# Patient Record
Sex: FEMALE | Race: WHITE | Employment: PART TIME | URBAN - NONMETROPOLITAN AREA
[De-identification: names, ages, dates, MRNs, and addresses within clinical notes are randomized per-mention and may not be internally consistent; named-entity substitution may affect disease eponyms.]

---

## 2018-12-02 ENCOUNTER — HOSPITAL ENCOUNTER (EMERGENCY)
Age: 43
Discharge: HOME | End: 2018-12-02
Attending: EMERGENCY MEDICINE
Payer: COMMERCIAL

## 2018-12-02 ENCOUNTER — OFFICE VISIT (OUTPATIENT)
Dept: URGENT CARE | Age: 43
End: 2018-12-02

## 2018-12-02 ENCOUNTER — APPOINTMENT (OUTPATIENT)
Dept: CT IMAGING | Age: 43
End: 2018-12-02
Payer: COMMERCIAL

## 2018-12-02 VITALS
RESPIRATION RATE: 15 BRPM | WEIGHT: 270 LBS | SYSTOLIC BLOOD PRESSURE: 135 MMHG | TEMPERATURE: 97.9 F | OXYGEN SATURATION: 98 % | DIASTOLIC BLOOD PRESSURE: 76 MMHG | HEART RATE: 66 BPM | BODY MASS INDEX: 51.02 KG/M2

## 2018-12-02 VITALS
HEART RATE: 69 BPM | OXYGEN SATURATION: 98 % | WEIGHT: 268 LBS | SYSTOLIC BLOOD PRESSURE: 138 MMHG | HEIGHT: 61 IN | DIASTOLIC BLOOD PRESSURE: 96 MMHG | TEMPERATURE: 97.2 F | RESPIRATION RATE: 18 BRPM | BODY MASS INDEX: 50.6 KG/M2

## 2018-12-02 DIAGNOSIS — R11.2 NAUSEA AND VOMITING, INTRACTABILITY OF VOMITING NOT SPECIFIED, UNSPECIFIED VOMITING TYPE: ICD-10-CM

## 2018-12-02 DIAGNOSIS — R11.2 NAUSEA VOMITING AND DIARRHEA: Primary | ICD-10-CM

## 2018-12-02 DIAGNOSIS — R19.7 NAUSEA VOMITING AND DIARRHEA: Primary | ICD-10-CM

## 2018-12-02 DIAGNOSIS — R10.13 EPIGASTRIC PAIN: ICD-10-CM

## 2018-12-02 DIAGNOSIS — R10.84 GENERALIZED ABDOMINAL PAIN: Primary | ICD-10-CM

## 2018-12-02 LAB
ALBUMIN SERPL-MCNC: 4 G/DL (ref 3.5–5.2)
ALP BLD-CCNC: 95 U/L (ref 35–104)
ALT SERPL-CCNC: 21 U/L (ref 5–33)
AMYLASE: 19 U/L (ref 28–100)
ANION GAP SERPL CALCULATED.3IONS-SCNC: 14 MMOL/L (ref 7–19)
AST SERPL-CCNC: 13 U/L (ref 5–32)
BASOPHILS ABSOLUTE: 0 K/UL (ref 0–0.2)
BASOPHILS RELATIVE PERCENT: 0.3 % (ref 0–1)
BILIRUB SERPL-MCNC: <0.2 MG/DL (ref 0.2–1.2)
BILIRUBIN URINE: NEGATIVE
BLOOD, URINE: NEGATIVE
BUN BLDV-MCNC: 18 MG/DL (ref 6–20)
CALCIUM SERPL-MCNC: 9.2 MG/DL (ref 8.6–10)
CHLORIDE BLD-SCNC: 102 MMOL/L (ref 98–111)
CLARITY: CLEAR
CO2: 24 MMOL/L (ref 22–29)
COLOR: YELLOW
CREAT SERPL-MCNC: 1 MG/DL (ref 0.5–0.9)
EOSINOPHILS ABSOLUTE: 0.1 K/UL (ref 0–0.6)
EOSINOPHILS RELATIVE PERCENT: 0.5 % (ref 0–5)
GFR NON-AFRICAN AMERICAN: >60
GLUCOSE BLD-MCNC: 127 MG/DL (ref 74–109)
GLUCOSE URINE: NEGATIVE MG/DL
HCG QUALITATIVE: NEGATIVE
HCT VFR BLD CALC: 44.1 % (ref 37–47)
HEMOGLOBIN: 15 G/DL (ref 12–16)
KETONES, URINE: NEGATIVE MG/DL
LEUKOCYTE ESTERASE, URINE: NEGATIVE
LIPASE: 13 U/L (ref 13–60)
LYMPHOCYTES ABSOLUTE: 3 K/UL (ref 1.1–4.5)
LYMPHOCYTES RELATIVE PERCENT: 30.3 % (ref 20–40)
MCH RBC QN AUTO: 29.3 PG (ref 27–31)
MCHC RBC AUTO-ENTMCNC: 34 G/DL (ref 33–37)
MCV RBC AUTO: 86.1 FL (ref 81–99)
MONOCYTES ABSOLUTE: 0.5 K/UL (ref 0–0.9)
MONOCYTES RELATIVE PERCENT: 4.9 % (ref 0–10)
NEUTROPHILS ABSOLUTE: 6.4 K/UL (ref 1.5–7.5)
NEUTROPHILS RELATIVE PERCENT: 63.7 % (ref 50–65)
NITRITE, URINE: NEGATIVE
PDW BLD-RTO: 12 % (ref 11.5–14.5)
PH UA: 6
PLATELET # BLD: 262 K/UL (ref 130–400)
PMV BLD AUTO: 9.9 FL (ref 9.4–12.3)
POTASSIUM REFLEX MAGNESIUM: 3.7 MMOL/L (ref 3.5–5)
PROTEIN UA: NEGATIVE MG/DL
RBC # BLD: 5.12 M/UL (ref 4.2–5.4)
SODIUM BLD-SCNC: 140 MMOL/L (ref 136–145)
SPECIFIC GRAVITY UA: 1.04
TOTAL PROTEIN: 6.9 G/DL (ref 6.6–8.7)
URINE REFLEX TO CULTURE: NORMAL
UROBILINOGEN, URINE: 0.2 E.U./DL
WBC # BLD: 10 K/UL (ref 4.8–10.8)

## 2018-12-02 PROCEDURE — 6360000004 HC RX CONTRAST MEDICATION: Performed by: EMERGENCY MEDICINE

## 2018-12-02 PROCEDURE — S0028 INJECTION, FAMOTIDINE, 20 MG: HCPCS | Performed by: EMERGENCY MEDICINE

## 2018-12-02 PROCEDURE — 82150 ASSAY OF AMYLASE: CPT

## 2018-12-02 PROCEDURE — 6360000002 HC RX W HCPCS: Performed by: EMERGENCY MEDICINE

## 2018-12-02 PROCEDURE — 99999 PR OFFICE/OUTPT VISIT,PROCEDURE ONLY: CPT | Performed by: NURSE PRACTITIONER

## 2018-12-02 PROCEDURE — 96374 THER/PROPH/DIAG INJ IV PUSH: CPT

## 2018-12-02 PROCEDURE — 36415 COLL VENOUS BLD VENIPUNCTURE: CPT

## 2018-12-02 PROCEDURE — 2500000003 HC RX 250 WO HCPCS: Performed by: EMERGENCY MEDICINE

## 2018-12-02 PROCEDURE — 99284 EMERGENCY DEPT VISIT MOD MDM: CPT | Performed by: EMERGENCY MEDICINE

## 2018-12-02 PROCEDURE — 99284 EMERGENCY DEPT VISIT MOD MDM: CPT

## 2018-12-02 PROCEDURE — 80053 COMPREHEN METABOLIC PANEL: CPT

## 2018-12-02 PROCEDURE — 2580000003 HC RX 258: Performed by: EMERGENCY MEDICINE

## 2018-12-02 PROCEDURE — 74177 CT ABD & PELVIS W/CONTRAST: CPT

## 2018-12-02 PROCEDURE — 85025 COMPLETE CBC W/AUTO DIFF WBC: CPT

## 2018-12-02 PROCEDURE — 96376 TX/PRO/DX INJ SAME DRUG ADON: CPT

## 2018-12-02 PROCEDURE — 84703 CHORIONIC GONADOTROPIN ASSAY: CPT

## 2018-12-02 PROCEDURE — 81003 URINALYSIS AUTO W/O SCOPE: CPT

## 2018-12-02 PROCEDURE — 83690 ASSAY OF LIPASE: CPT

## 2018-12-02 PROCEDURE — 96375 TX/PRO/DX INJ NEW DRUG ADDON: CPT

## 2018-12-02 RX ORDER — FAMOTIDINE 20 MG/1
20 TABLET, FILM COATED ORAL 2 TIMES DAILY
Qty: 60 TABLET | Refills: 0 | Status: SHIPPED | OUTPATIENT
Start: 2018-12-02 | End: 2019-04-25

## 2018-12-02 RX ORDER — ONDANSETRON 2 MG/ML
4 INJECTION INTRAMUSCULAR; INTRAVENOUS ONCE
Status: COMPLETED | OUTPATIENT
Start: 2018-12-02 | End: 2018-12-02

## 2018-12-02 RX ORDER — LORAZEPAM 2 MG/ML
0.5 INJECTION INTRAMUSCULAR ONCE
Status: COMPLETED | OUTPATIENT
Start: 2018-12-02 | End: 2018-12-02

## 2018-12-02 RX ORDER — HYDROCODONE BITARTRATE AND ACETAMINOPHEN 7.5; 325 MG/1; MG/1
1 TABLET ORAL EVERY 6 HOURS PRN
Qty: 15 TABLET | Refills: 0 | Status: SHIPPED | OUTPATIENT
Start: 2018-12-02 | End: 2018-12-05

## 2018-12-02 RX ORDER — ONDANSETRON 8 MG/1
8 TABLET, ORALLY DISINTEGRATING ORAL EVERY 8 HOURS PRN
Qty: 15 TABLET | Refills: 0 | Status: SHIPPED | OUTPATIENT
Start: 2018-12-02 | End: 2019-04-25

## 2018-12-02 RX ORDER — 0.9 % SODIUM CHLORIDE 0.9 %
1000 INTRAVENOUS SOLUTION INTRAVENOUS ONCE
Status: COMPLETED | OUTPATIENT
Start: 2018-12-02 | End: 2018-12-02

## 2018-12-02 RX ADMIN — IOPAMIDOL 90 ML: 755 INJECTION, SOLUTION INTRAVENOUS at 17:40

## 2018-12-02 RX ADMIN — ONDANSETRON 4 MG: 2 INJECTION INTRAMUSCULAR; INTRAVENOUS at 16:36

## 2018-12-02 RX ADMIN — LORAZEPAM 1 MG: 2 INJECTION INTRAMUSCULAR; INTRAVENOUS at 17:27

## 2018-12-02 RX ADMIN — FAMOTIDINE 40 MG: 10 INJECTION, SOLUTION INTRAVENOUS at 17:27

## 2018-12-02 RX ADMIN — ONDANSETRON 4 MG: 2 INJECTION INTRAMUSCULAR; INTRAVENOUS at 19:48

## 2018-12-02 RX ADMIN — SODIUM CHLORIDE 1000 ML: 9 INJECTION, SOLUTION INTRAVENOUS at 17:27

## 2018-12-02 ASSESSMENT — ENCOUNTER SYMPTOMS
DIARRHEA: 1
VOMITING: 1
NAUSEA: 1
CONSTIPATION: 0
CHOKING: 0
EYE DISCHARGE: 0
FACIAL SWELLING: 0
VOICE CHANGE: 0
BLOOD IN STOOL: 0
SHORTNESS OF BREATH: 0
SINUS PRESSURE: 0
APNEA: 0
SORE THROAT: 0
ABDOMINAL PAIN: 1

## 2019-02-06 ENCOUNTER — TELEPHONE (OUTPATIENT)
Dept: OBGYN | Age: 44
End: 2019-02-06

## 2019-02-06 DIAGNOSIS — Z12.31 ENCOUNTER FOR SCREENING MAMMOGRAM FOR BREAST CANCER: Primary | ICD-10-CM

## 2019-04-25 ENCOUNTER — OFFICE VISIT (OUTPATIENT)
Dept: OBGYN | Age: 44
End: 2019-04-25
Payer: COMMERCIAL

## 2019-04-25 VITALS
SYSTOLIC BLOOD PRESSURE: 130 MMHG | BODY MASS INDEX: 50.61 KG/M2 | DIASTOLIC BLOOD PRESSURE: 90 MMHG | WEIGHT: 275 LBS | HEIGHT: 62 IN

## 2019-04-25 DIAGNOSIS — N91.2 AMENORRHEA: Primary | ICD-10-CM

## 2019-04-25 DIAGNOSIS — N94.89 OTHER SPECIFIED CONDITIONS ASSOCIATED WITH FEMALE GENITAL ORGANS AND MENSTRUAL CYCLE: ICD-10-CM

## 2019-04-25 DIAGNOSIS — Z32.01 PREGNANCY TEST POSITIVE: ICD-10-CM

## 2019-04-25 DIAGNOSIS — N91.2 AMENORRHEA: ICD-10-CM

## 2019-04-25 LAB
GONADOTROPIN, CHORIONIC (HCG) QUANT: 1129 MIU/ML (ref 0–5.3)
PROGESTERONE LEVEL: 19.41 NG/ML

## 2019-04-25 PROCEDURE — 99214 OFFICE O/P EST MOD 30 MIN: CPT | Performed by: ADVANCED PRACTICE MIDWIFE

## 2019-04-25 PROCEDURE — 81025 URINE PREGNANCY TEST: CPT | Performed by: ADVANCED PRACTICE MIDWIFE

## 2019-04-25 ASSESSMENT — ENCOUNTER SYMPTOMS
EYES NEGATIVE: 1
ALLERGIC/IMMUNOLOGIC NEGATIVE: 1
GASTROINTESTINAL NEGATIVE: 1
RESPIRATORY NEGATIVE: 1

## 2019-04-25 NOTE — PROGRESS NOTES
[facility]  CNM Office Note    Angel Jackson is a 44 y.o. female who presents today for her medical conditions/ complaints as noted below. Chief Complaint   Patient presents with    Confirmation     Patient had a +HPT         HPI  Kaylee Garcia presents to office for confirmation of pregnancy. This is unplanned. She is tearful and in shock. She is not taking any medications and has no significant health concerns. There is no problem list on file for this patient. Patient's last menstrual period was 2019 (lmp unknown).     Past Medical History:   Diagnosis Date    Ovarian cyst      Past Surgical History:   Procedure Laterality Date    DENTAL SURGERY      DILATION AND CURETTAGE OF UTERUS      OVARIAN CYST SURGERY       History reviewed. No pertinent family history. Social History     Tobacco Use    Smoking status: Never Smoker    Smokeless tobacco: Never Used   Substance Use Topics    Alcohol use: No       No current outpatient medications on file. No current facility-administered medications for this visit. No Known Allergies  Vitals:    19 1303   BP: (!) 130/90     Body mass index is 50.3 kg/m². Review of Systems   Constitutional: Negative. HENT: Negative. Eyes: Negative. Respiratory: Negative. Cardiovascular: Negative. Gastrointestinal: Negative. Endocrine: Negative. Genitourinary: Negative. Musculoskeletal: Negative. Skin: Negative. Allergic/Immunologic: Negative. Neurological: Negative. Hematological: Negative. Psychiatric/Behavioral: The patient is nervous/anxious. Physical Exam   Constitutional: She is oriented to person, place, and time. She appears well-developed and well-nourished. HENT:   Head: Normocephalic and atraumatic. Nose: Nose normal.   Eyes: Pupils are equal, round, and reactive to light. Conjunctivae and EOM are normal.   Neck: Normal range of motion. Neck supple. No tracheal deviation present.  No thyromegaly present. Pulmonary/Chest: Effort normal. No respiratory distress. Musculoskeletal: Normal range of motion. Normal ROM for upper and lower extremities. Gait steady. Neurological: She is alert and oriented to person, place, and time. Skin: Skin is warm and dry. No lesion and no rash noted. She is not diaphoretic. Psychiatric: She has a normal mood and affect. Her speech is normal and behavior is normal.   Nursing note and vitals reviewed. Diagnosis Orders   1. Amenorrhea  POCT urine pregnancy    HCG, Quantitative, Pregnancy    Progesterone   2. Other specified conditions associated with female genital organs and menstrual cycle     3. Pregnancy test positive         MEDICATIONS:  No orders of the defined types were placed in this encounter. ORDERS:  Orders Placed This Encounter   Procedures    HCG, Quantitative, Pregnancy    Progesterone    POCT urine pregnancy       PLAN:  Check quant today with progesterone level. Begin PNV. F/u for education and initial OB visit.

## 2019-04-26 DIAGNOSIS — O36.80X0 ENCOUNTER TO DETERMINE FETAL VIABILITY OF PREGNANCY, SINGLE OR UNSPECIFIED FETUS: Primary | ICD-10-CM

## 2019-05-01 ENCOUNTER — HOSPITAL ENCOUNTER (OUTPATIENT)
Dept: ULTRASOUND IMAGING | Age: 44
Discharge: HOME | End: 2019-05-01
Payer: COMMERCIAL

## 2019-05-01 DIAGNOSIS — O36.80X0 ENCOUNTER TO DETERMINE FETAL VIABILITY OF PREGNANCY, SINGLE OR UNSPECIFIED FETUS: ICD-10-CM

## 2019-05-01 PROCEDURE — 76801 OB US < 14 WKS SINGLE FETUS: CPT

## 2019-05-02 DIAGNOSIS — O36.80X0 ENCOUNTER TO DETERMINE FETAL VIABILITY OF PREGNANCY, SINGLE OR UNSPECIFIED FETUS: Primary | ICD-10-CM

## 2019-05-16 ENCOUNTER — INITIAL PRENATAL (OUTPATIENT)
Dept: OBGYN | Age: 44
End: 2019-05-16

## 2019-05-16 ENCOUNTER — HOSPITAL ENCOUNTER (OUTPATIENT)
Dept: ULTRASOUND IMAGING | Age: 44
Discharge: HOME | End: 2019-05-16
Payer: COMMERCIAL

## 2019-05-16 VITALS
BODY MASS INDEX: 49.57 KG/M2 | SYSTOLIC BLOOD PRESSURE: 120 MMHG | HEART RATE: 77 BPM | WEIGHT: 271 LBS | DIASTOLIC BLOOD PRESSURE: 86 MMHG

## 2019-05-16 DIAGNOSIS — O36.80X0 ENCOUNTER TO DETERMINE FETAL VIABILITY OF PREGNANCY, SINGLE OR UNSPECIFIED FETUS: Primary | ICD-10-CM

## 2019-05-16 DIAGNOSIS — Z86.32 HISTORY OF GESTATIONAL DIABETES MELLITUS (GDM): ICD-10-CM

## 2019-05-16 DIAGNOSIS — O09.521 ELDERLY MULTIGRAVIDA IN FIRST TRIMESTER: ICD-10-CM

## 2019-05-16 DIAGNOSIS — O36.80X0 ENCOUNTER TO DETERMINE FETAL VIABILITY OF PREGNANCY, SINGLE OR UNSPECIFIED FETUS: ICD-10-CM

## 2019-05-16 DIAGNOSIS — O99.211 OBESITY AFFECTING PREGNANCY IN FIRST TRIMESTER: ICD-10-CM

## 2019-05-16 DIAGNOSIS — Z36.9 ANTENATAL SCREENING ENCOUNTER: ICD-10-CM

## 2019-05-16 LAB
ABO/RH: NORMAL
ANTIBODY SCREEN: NORMAL
HBA1C MFR BLD: 7.4 % (ref 4–6)

## 2019-05-16 PROCEDURE — 76817 TRANSVAGINAL US OBSTETRIC: CPT

## 2019-05-16 PROCEDURE — 0502F SUBSEQUENT PRENATAL CARE: CPT | Performed by: ADVANCED PRACTICE MIDWIFE

## 2019-05-16 NOTE — PROGRESS NOTES
Pt denies any vaginal leaking bleeding or contractions. Patient states she has some light pink that changed to dark discharge, couple drops but nothing since.  Patient has been sick
taking PNV daily, and exercise. Continue with routine prenatal care. RTC in 2 wks for prenatal visit, u/s, physical exam, and results  PNL and u/s today, adding A1C, plan NIPPS next visit and early MFM consult soon  MEDICATIONS:  No orders of the defined types were placed in this encounter.     ORDERS:  Orders Placed This Encounter   Procedures    C.trachomatis N.gonorrhoeae DNA    Urine Culture    Cystic fibrosis carrier study    Herpes simplex virus (HSV) I/II antibodies IgG & IgM w/ reflex    HIV Obstetric Panel    Varicella Zoster Antibody, IgG    Hemoglobin A1C

## 2019-05-18 LAB — URINE CULTURE, ROUTINE: NORMAL

## 2019-05-19 LAB
APTIMA MEDIA TYPE: NORMAL
CHLAMYDIA TRACHOMATIS AMPLIFIED DET: NEGATIVE
N GONORRHOEAE AMPLIFIED DET: NEGATIVE
SPECIMEN SOURCE: NORMAL

## 2019-05-20 LAB
BASOPHILS ABSOLUTE: 0 K/UL (ref 0–0.2)
BASOPHILS RELATIVE PERCENT: 0.3 % (ref 0–1)
CYSTIC FIBROSIS 165 VARIANTS INTERP: NORMAL
CYSTIC FIBROSIS 5T VARIANT: NORMAL
CYSTIC FIBROSIS ALLELE 1: NEGATIVE
CYSTIC FIBROSIS ALLELE 2: NEGATIVE
EOSINOPHILS ABSOLUTE: 0.1 K/UL (ref 0–0.6)
EOSINOPHILS RELATIVE PERCENT: 0.5 % (ref 0–5)
HCT VFR BLD CALC: 45.7 % (ref 37–47)
HEMOGLOBIN: 15.3 G/DL (ref 12–16)
HEPATITIS B SURFACE ANTIGEN INTERPRETATION: ABNORMAL
HIV 1,2 COMBO ANTIGEN/ANTIBODY: ABNORMAL
LYMPHOCYTES ABSOLUTE: 3 K/UL (ref 1.1–4.5)
LYMPHOCYTES RELATIVE PERCENT: 22.6 % (ref 20–40)
MCH RBC QN AUTO: 29.9 PG (ref 27–31)
MCHC RBC AUTO-ENTMCNC: 33.5 G/DL (ref 33–37)
MCV RBC AUTO: 89.4 FL (ref 81–99)
MONOCYTES ABSOLUTE: 0.5 K/UL (ref 0–0.9)
MONOCYTES RELATIVE PERCENT: 3.8 % (ref 0–10)
NEUTROPHILS ABSOLUTE: 9.5 K/UL (ref 1.5–7.5)
NEUTROPHILS RELATIVE PERCENT: 72.3 % (ref 50–65)
PDW BLD-RTO: 12.5 % (ref 11.5–14.5)
PLATELET # BLD: 266 K/UL (ref 130–400)
PMV BLD AUTO: 10.8 FL (ref 9.4–12.3)
RBC # BLD: 5.11 M/UL (ref 4.2–5.4)
RPR: ABNORMAL
RUBELLA ANTIBODY IGG: REACTIVE
VZV IGG SER QL IA: 1.73
WBC # BLD: 13.1 K/UL (ref 4.8–10.8)

## 2019-05-21 LAB
HERPES TYPE 1/2 IGM COMBINED: 0.48 IV
HERPES TYPE I/II IGG COMBINED: >22.4 IV
HSV 1 GLYCOPROTEIN G AB IGG: 35.5 IV
HSV 2 GLYCOPROTEIN G AB IGG: 0.28 IV

## 2019-05-30 ENCOUNTER — HOSPITAL ENCOUNTER (OUTPATIENT)
Dept: ULTRASOUND IMAGING | Age: 44
Discharge: HOME | End: 2019-05-30
Payer: COMMERCIAL

## 2019-05-30 ENCOUNTER — ROUTINE PRENATAL (OUTPATIENT)
Dept: OBGYN | Age: 44
End: 2019-05-30
Payer: COMMERCIAL

## 2019-05-30 VITALS
WEIGHT: 272 LBS | HEART RATE: 70 BPM | BODY MASS INDEX: 49.75 KG/M2 | DIASTOLIC BLOOD PRESSURE: 105 MMHG | SYSTOLIC BLOOD PRESSURE: 149 MMHG

## 2019-05-30 DIAGNOSIS — Z86.32 HISTORY OF GESTATIONAL DIABETES MELLITUS (GDM): ICD-10-CM

## 2019-05-30 DIAGNOSIS — O36.80X0 ENCOUNTER TO DETERMINE FETAL VIABILITY OF PREGNANCY, SINGLE OR UNSPECIFIED FETUS: ICD-10-CM

## 2019-05-30 DIAGNOSIS — O02.1 MISSED ABORTION: ICD-10-CM

## 2019-05-30 DIAGNOSIS — O99.211 OBESITY AFFECTING PREGNANCY IN FIRST TRIMESTER: ICD-10-CM

## 2019-05-30 DIAGNOSIS — O02.1 MISSED ABORTION: Primary | ICD-10-CM

## 2019-05-30 DIAGNOSIS — O09.521 ELDERLY MULTIGRAVIDA IN FIRST TRIMESTER: ICD-10-CM

## 2019-05-30 LAB
GONADOTROPIN, CHORIONIC (HCG) QUANT: 6580 MIU/ML (ref 0–5.3)
PROGESTERONE LEVEL: 10.71 NG/ML

## 2019-05-30 PROCEDURE — 76817 TRANSVAGINAL US OBSTETRIC: CPT

## 2019-05-30 PROCEDURE — 99214 OFFICE O/P EST MOD 30 MIN: CPT | Performed by: ADVANCED PRACTICE MIDWIFE

## 2019-05-30 RX ORDER — MISOPROSTOL 100 UG/1
400 TABLET ORAL DAILY
Qty: 12 TABLET | Refills: 0 | Status: SHIPPED | OUTPATIENT
Start: 2019-05-30 | End: 2019-10-03 | Stop reason: ALTCHOICE

## 2019-05-30 RX ORDER — IBUPROFEN 800 MG/1
800 TABLET ORAL EVERY 8 HOURS PRN
Qty: 20 TABLET | Refills: 0 | Status: SHIPPED | OUTPATIENT
Start: 2019-05-30 | End: 2020-10-05

## 2019-05-30 RX ORDER — PROMETHAZINE HYDROCHLORIDE 25 MG/1
25 TABLET ORAL 4 TIMES DAILY PRN
Qty: 10 TABLET | Refills: 0 | Status: SHIPPED | OUTPATIENT
Start: 2019-05-30 | End: 2019-06-06

## 2019-05-30 RX ORDER — HYDROCODONE BITARTRATE AND ACETAMINOPHEN 5; 325 MG/1; MG/1
1 TABLET ORAL EVERY 4 HOURS PRN
Qty: 8 TABLET | Refills: 0 | Status: SHIPPED | OUTPATIENT
Start: 2019-05-30 | End: 2019-06-02

## 2019-05-30 NOTE — PROGRESS NOTES
CNM Prenatal Office Note  Subjective:  Don Palma is here for a return obstetrical visit. There is no FHT on u/s today with signs correlating with fetal demise. She reports minimal spotting and no pain. Objective: Mother's Prenatal Vitals  BP: (!) 149/105  Weight: 272 lb (123.4 kg)  Pulse: 70  Patient Position: Sitting  Prenatal Fetal Information  Fetal Heart Rate: US  Movement: Absent  Pt is A&Ox3, in no acute distress. Normocephalic, atraumatic. PERRL. Resp even and non-labored. Skin pink, warm & dry. Gravid abdomen. CHASE's well. Gait steady. Assessment:    IUP at 11w0d wks      Diagnosis Orders   1. Missed   HCG, Quantitative, Pregnancy    Progesterone    misoprostol (CYTOTEC) 100 MCG tablet    HYDROcodone-acetaminophen (NORCO) 5-325 MG per tablet     Plan:   Pt counseled on expectant vs med/surg management. She is leaving for vacation on Saturday. She would like to do things herself without surgery. We are checking quant today and will repeat tomorrow. Medications were sent and she would like to start medications when bleeding begins. MEDICATIONS:  Orders Placed This Encounter   Medications    misoprostol (CYTOTEC) 100 MCG tablet     Sig: Take 4 tablets by mouth daily Begin medication when bleeding begins. Repeat daily x 3 days. Dispense:  12 tablet     Refill:  0    HYDROcodone-acetaminophen (NORCO) 5-325 MG per tablet     Sig: Take 1 tablet by mouth every 4 hours as needed for Pain for up to 3 days. Intended supply: 3 days.  Take lowest dose possible to manage pain     Dispense:  8 tablet     Refill:  0     Reduce doses taken as pain becomes manageable    promethazine (PHENERGAN) 25 MG tablet     Sig: Take 1 tablet by mouth 4 times daily as needed for Nausea     Dispense:  10 tablet     Refill:  0    ibuprofen (ADVIL;MOTRIN) 800 MG tablet     Sig: Take 1 tablet by mouth every 8 hours as needed for Pain     Dispense:  20 tablet     Refill:  0     ORDERS:  Orders Placed This Encounter Procedures    HCG, Quantitative, Pregnancy    Progesterone       More than 50% of this 40 min visit was education and counseling.

## 2019-05-30 NOTE — PROGRESS NOTES
Pt denies any vaginal leaking bleeding or contractions. + Fetal movement.    Had an [address] today

## 2019-05-31 DIAGNOSIS — O02.1 MISSED ABORTION: ICD-10-CM

## 2019-05-31 LAB — GONADOTROPIN, CHORIONIC (HCG) QUANT: 5425 MIU/ML (ref 0–5.3)

## 2019-07-30 ENCOUNTER — TELEPHONE (OUTPATIENT)
Dept: OBGYN | Age: 44
End: 2019-07-30

## 2019-07-30 NOTE — TELEPHONE ENCOUNTER
Pt wants a letter saying her dog is a therapeutic dog for her apartment complex rules. They do not allow pets unless it for therapy reasons. She needs due to miscarriage. Please call pt she can pick it up.

## 2019-10-03 ENCOUNTER — HOSPITAL ENCOUNTER (OUTPATIENT)
Dept: WOMENS IMAGING | Age: 44
Discharge: HOME | End: 2019-10-03
Payer: COMMERCIAL

## 2019-10-03 ENCOUNTER — OFFICE VISIT (OUTPATIENT)
Dept: OBGYN | Age: 44
End: 2019-10-03
Payer: COMMERCIAL

## 2019-10-03 VITALS
DIASTOLIC BLOOD PRESSURE: 107 MMHG | HEIGHT: 62 IN | WEIGHT: 275 LBS | BODY MASS INDEX: 50.61 KG/M2 | SYSTOLIC BLOOD PRESSURE: 147 MMHG

## 2019-10-03 DIAGNOSIS — B37.2 CANDIDAL DERMATITIS: ICD-10-CM

## 2019-10-03 DIAGNOSIS — Z30.014 ENCOUNTER FOR INITIAL PRESCRIPTION OF INTRAUTERINE CONTRACEPTIVE DEVICE (IUD): ICD-10-CM

## 2019-10-03 DIAGNOSIS — Z12.31 SCREENING MAMMOGRAM, ENCOUNTER FOR: ICD-10-CM

## 2019-10-03 DIAGNOSIS — Z80.0 FAMILY HISTORY OF COLON CANCER IN MOTHER: ICD-10-CM

## 2019-10-03 DIAGNOSIS — Z12.4 SCREENING FOR CERVICAL CANCER: ICD-10-CM

## 2019-10-03 DIAGNOSIS — Z11.51 SCREENING FOR HPV (HUMAN PAPILLOMAVIRUS): ICD-10-CM

## 2019-10-03 DIAGNOSIS — Z01.419 ENCOUNTER FOR WELL WOMAN EXAM: Primary | ICD-10-CM

## 2019-10-03 DIAGNOSIS — L02.229 BOIL OF TRUNK: ICD-10-CM

## 2019-10-03 PROBLEM — O99.211 OBESITY AFFECTING PREGNANCY IN FIRST TRIMESTER: Status: RESOLVED | Noted: 2019-05-16 | Resolved: 2019-10-03

## 2019-10-03 PROBLEM — O09.521 ELDERLY MULTIGRAVIDA IN FIRST TRIMESTER: Status: RESOLVED | Noted: 2019-05-16 | Resolved: 2019-10-03

## 2019-10-03 PROBLEM — Z86.32 HISTORY OF GESTATIONAL DIABETES MELLITUS (GDM): Status: RESOLVED | Noted: 2019-05-16 | Resolved: 2019-10-03

## 2019-10-03 PROCEDURE — 77063 BREAST TOMOSYNTHESIS BI: CPT

## 2019-10-03 PROCEDURE — 99396 PREV VISIT EST AGE 40-64: CPT | Performed by: ADVANCED PRACTICE MIDWIFE

## 2019-10-03 RX ORDER — PHENTERMINE HYDROCHLORIDE 37.5 MG/1
37.5 CAPSULE ORAL EVERY MORNING
COMMUNITY
End: 2020-10-05

## 2019-10-03 RX ORDER — LORATADINE 10 MG/1
10 CAPSULE, LIQUID FILLED ORAL DAILY
COMMUNITY
End: 2020-10-05

## 2019-10-03 RX ORDER — NYSTATIN 100000 [USP'U]/G
POWDER TOPICAL
Qty: 1 BOTTLE | Refills: 1 | Status: SHIPPED | OUTPATIENT
Start: 2019-10-03 | End: 2020-10-21

## 2019-10-03 RX ORDER — CLINDAMYCIN PHOSPHATE 11.9 MG/ML
SOLUTION TOPICAL
Qty: 30 ML | Refills: 1 | Status: SHIPPED | OUTPATIENT
Start: 2019-10-03 | End: 2019-11-02

## 2019-10-03 ASSESSMENT — ENCOUNTER SYMPTOMS
ALLERGIC/IMMUNOLOGIC NEGATIVE: 1
EYES NEGATIVE: 1
GASTROINTESTINAL NEGATIVE: 1
RESPIRATORY NEGATIVE: 1

## 2019-10-10 LAB
HPV COMMENT: NORMAL
HPV TYPE 16: NOT DETECTED
HPV TYPE 18: NOT DETECTED
HPVOH (OTHER TYPES): NOT DETECTED

## 2019-10-31 ENCOUNTER — PROCEDURE VISIT (OUTPATIENT)
Dept: OBGYN | Age: 44
End: 2019-10-31
Payer: COMMERCIAL

## 2019-10-31 VITALS
BODY MASS INDEX: 51.12 KG/M2 | HEART RATE: 66 BPM | DIASTOLIC BLOOD PRESSURE: 97 MMHG | WEIGHT: 275 LBS | SYSTOLIC BLOOD PRESSURE: 156 MMHG

## 2019-10-31 DIAGNOSIS — Z30.430 ENCOUNTER FOR INSERTION OF MIRENA IUD: Primary | ICD-10-CM

## 2019-10-31 DIAGNOSIS — Z01.812 PRE-PROCEDURE LAB EXAM: ICD-10-CM

## 2019-10-31 DIAGNOSIS — N92.0 MENORRHAGIA WITH REGULAR CYCLE: ICD-10-CM

## 2019-10-31 PROCEDURE — 81025 URINE PREGNANCY TEST: CPT | Performed by: ADVANCED PRACTICE MIDWIFE

## 2019-10-31 PROCEDURE — 58300 INSERT INTRAUTERINE DEVICE: CPT | Performed by: ADVANCED PRACTICE MIDWIFE

## 2019-10-31 ASSESSMENT — ENCOUNTER SYMPTOMS
GASTROINTESTINAL NEGATIVE: 1
EYES NEGATIVE: 1
ALLERGIC/IMMUNOLOGIC NEGATIVE: 1
RESPIRATORY NEGATIVE: 1

## 2020-01-13 ENCOUNTER — OFFICE VISIT (OUTPATIENT)
Dept: RETAIL CLINIC | Facility: CLINIC | Age: 45
End: 2020-01-13

## 2020-01-13 VITALS
SYSTOLIC BLOOD PRESSURE: 120 MMHG | OXYGEN SATURATION: 97 % | TEMPERATURE: 98.1 F | WEIGHT: 273 LBS | HEART RATE: 69 BPM | DIASTOLIC BLOOD PRESSURE: 74 MMHG

## 2020-01-13 DIAGNOSIS — J02.9 ACUTE PHARYNGITIS, UNSPECIFIED ETIOLOGY: ICD-10-CM

## 2020-01-13 DIAGNOSIS — J20.9 ACUTE BRONCHITIS, UNSPECIFIED ORGANISM: Primary | ICD-10-CM

## 2020-01-13 DIAGNOSIS — H65.193 ACUTE MEE (MIDDLE EAR EFFUSION), BILATERAL: ICD-10-CM

## 2020-01-13 LAB
EXPIRATION DATE: NORMAL
FLUAV AG NPH QL: NEGATIVE
FLUBV AG NPH QL: NEGATIVE
INTERNAL CONTROL: NORMAL
Lab: NORMAL

## 2020-01-13 PROCEDURE — 99213 OFFICE O/P EST LOW 20 MIN: CPT | Performed by: NURSE PRACTITIONER

## 2020-01-13 PROCEDURE — 87804 INFLUENZA ASSAY W/OPTIC: CPT | Performed by: NURSE PRACTITIONER

## 2020-01-13 RX ORDER — AZITHROMYCIN 250 MG/1
TABLET, FILM COATED ORAL
Qty: 6 TABLET | Refills: 0 | Status: SHIPPED | OUTPATIENT
Start: 2020-01-13

## 2020-01-13 RX ORDER — GUAIFENESIN 600 MG/1
1200 TABLET, EXTENDED RELEASE ORAL 2 TIMES DAILY PRN
Qty: 40 TABLET | Refills: 0 | Status: SHIPPED | OUTPATIENT
Start: 2020-01-13

## 2020-01-13 RX ORDER — METHYLPREDNISOLONE 4 MG/1
TABLET ORAL
Qty: 1 EACH | Refills: 0 | Status: SHIPPED | OUTPATIENT
Start: 2020-01-13

## 2020-01-13 NOTE — PATIENT INSTRUCTIONS
Risks and benefits of steroids discussed. Take only as prescribed.   In summary:   · Steroids can increase blood pressure and blood sugar.  · Steroids can cause increased irritability, increased energy & appetite, and may disrupt sleep.   · They may cause sun sensitivity. You should avoid prolonged sun exposure and use sunscreen if outdoors.   · Do not take additional NSAIDS such as ibuprofen, Motrin, Advil, Aleve, Naproxen while taking this medicine.  It is okay to take Tylenol while taking steroids.  · There are additional long term risks of steroids if used long term/frequently.    An antibiotic has been prescribed for you that needs to be taken as directed for the full length of treatment. It is recommended that you take a probiotic when taking an antibiotic. Probiotics are found over the counter in pill form and in some brands of yogurt.      Increase fluid intake.     Take Mucinex (guaifenesin) as prescribed to thin drainage/sputum.     If no improvement after 3-4 days or if worse at any time please follow up with your primary care provider or go to urgent care.       Acute Bronchitis, Adult    Acute bronchitis is sudden (acute) swelling of the air tubes (bronchi) in the lungs. Acute bronchitis causes these tubes to fill with mucus, which can make it hard to breathe. It can also cause coughing or wheezing.  In adults, acute bronchitis usually goes away within 2 weeks. A cough caused by bronchitis may last up to 3 weeks. Smoking, allergies, and asthma can make the condition worse. Repeated episodes of bronchitis may cause further lung problems, such as chronic obstructive pulmonary disease (COPD).  What are the causes?  This condition can be caused by germs and by substances that irritate the lungs, including:  · Cold and flu viruses. This condition is most often caused by the same virus that causes a cold.  · Bacteria.  · Exposure to tobacco smoke, dust, fumes, and air pollution.  What increases the  risk?  This condition is more likely to develop in people who:  · Have close contact with someone with acute bronchitis.  · Are exposed to lung irritants, such as tobacco smoke, dust, fumes, and vapors.  · Have a weak immune system.  · Have a respiratory condition such as asthma.  What are the signs or symptoms?  Symptoms of this condition include:  · A cough.  · Coughing up clear, yellow, or green mucus.  · Wheezing.  · Chest congestion.  · Shortness of breath.  · A fever.  · Body aches.  · Chills.  · A sore throat.  How is this diagnosed?  This condition is usually diagnosed with a physical exam. During the exam, your health care provider may order tests, such as chest X-rays, to rule out other conditions. He or she may also:  · Test a sample of your mucus for bacterial infection.  · Check the level of oxygen in your blood. This is done to check for pneumonia.  · Do a chest X-ray or lung function testing to rule out pneumonia and other conditions.  · Perform blood tests.  Your health care provider will also ask about your symptoms and medical history.  How is this treated?  Most cases of acute bronchitis clear up over time without treatment. Your health care provider may recommend:  · Drinking more fluids. Drinking more makes your mucus thinner, which may make it easier to breathe.  · Taking a medicine for a fever or cough.  · Taking an antibiotic medicine.  · Using an inhaler to help improve shortness of breath and to control a cough.  · Using a cool mist vaporizer or humidifier to make it easier to breathe.  Follow these instructions at home:  Medicines  · Take over-the-counter and prescription medicines only as told by your health care provider.  · If you were prescribed an antibiotic, take it as told by your health care provider. Do not stop taking the antibiotic even if you start to feel better.  General instructions    · Get plenty of rest.  · Drink enough fluids to keep your urine pale yellow.  · Avoid  smoking and secondhand smoke. Exposure to cigarette smoke or irritating chemicals will make bronchitis worse. If you smoke and you need help quitting, ask your health care provider. Quitting smoking will help your lungs heal faster.  · Use an inhaler, cool mist vaporizer, or humidifier as told by your health care provider.  · Keep all follow-up visits as told by your health care provider. This is important.  How is this prevented?  To lower your risk of getting this condition again:  · Wash your hands often with soap and water. If soap and water are not available, use hand .  · Avoid contact with people who have cold symptoms.  · Try not to touch your hands to your mouth, nose, or eyes.  · Make sure to get the flu shot every year.  Contact a health care provider if:  · Your symptoms do not improve in 2 weeks of treatment.  Get help right away if:  · You cough up blood.  · You have chest pain.  · You have severe shortness of breath.  · You become dehydrated.  · You faint or keep feeling like you are going to faint.  · You keep vomiting.  · You have a severe headache.  · Your fever or chills gets worse.  This information is not intended to replace advice given to you by your health care provider. Make sure you discuss any questions you have with your health care provider.  Document Released: 01/25/2006 Document Revised: 08/01/2018 Document Reviewed: 06/07/2017  [employer] Interactive Patient Education © 2019 [employer]

## 2020-01-13 NOTE — PROGRESS NOTES
Chief Complaint   Patient presents with   • Sore Throat   • Cough     Subjective   Adele Ramirez is a 44 y.o. female who presents to the clinic today with complaints of:  Sore Throat    This is a new problem. Episode onset: four days ago. The problem has been waxing and waning. Associated symptoms include congestion and coughing. Pertinent negatives include no abdominal pain, diarrhea, ear pain, headaches, shortness of breath, trouble swallowing or vomiting. Treatments tried: Dayquil. The treatment provided mild relief.   Cough   This is a new problem. Episode onset: four days ago. The problem has been gradually worsening. The cough is productive of sputum (yellow/green). Associated symptoms include chills, nasal congestion, postnasal drip, a sore throat and wheezing. Pertinent negatives include no ear pain, fever, headaches or shortness of breath. Treatments tried: Dayquil. The treatment provided mild relief. There is no history of asthma, bronchitis, COPD or pneumonia.     Current Outpatient Medications:   •  Multiple Vitamins-Minerals (WOMENS MULTIVITAMIN PO), Take  by mouth., Disp: , Rfl:     Allergies:  Patient has no known allergies.    History reviewed. No pertinent past medical history.  History reviewed. No pertinent surgical history.  Family History   Problem Relation Age of Onset   • Cancer Mother    • Cancer Maternal Grandmother    • Cancer Maternal Grandfather      Social History     Tobacco Use   • Smoking status: Former Smoker   • Smokeless tobacco: Never Used   Substance Use Topics   • Alcohol use: Not Currently   • Drug use: Not on file       Review of Systems  Review of Systems   Constitutional: Positive for chills. Negative for fever.   HENT: Positive for congestion, postnasal drip and sore throat. Negative for ear pain, sinus pressure, sinus pain and trouble swallowing.    Respiratory: Positive for cough and wheezing. Negative for shortness of breath.    Gastrointestinal: Negative for abdominal  pain, diarrhea, nausea and vomiting.   Musculoskeletal: Positive for arthralgias (generalized).   Neurological: Negative for headaches.       Objective   /74   Pulse 69   Temp 98.1 °F (36.7 °C)   Wt 124 kg (273 lb)   LMP 12/31/2019   SpO2 97%       Physical Exam   Constitutional: She is oriented to person, place, and time. She appears well-developed and well-nourished. She is cooperative.  Non-toxic appearance. She appears ill (mildly). No distress.   HENT:   Head: Normocephalic and atraumatic.   Right Ear: External ear and ear canal normal. Tympanic membrane is retracted. Tympanic membrane is not perforated, not erythematous and not bulging. A middle ear effusion is present.   Left Ear: External ear and ear canal normal. Tympanic membrane is bulging. Tympanic membrane is not perforated, not erythematous and not retracted. A middle ear effusion is present.   Nose: Nose normal. No sinus tenderness.   Mouth/Throat: Uvula is midline and mucous membranes are normal. Posterior oropharyngeal erythema (minimal erythema, clear PND) present.   Eyes: Pupils are equal, round, and reactive to light. Conjunctivae, EOM and lids are normal.   Neck: Trachea normal and normal range of motion. Neck supple.   Cardiovascular: Normal rate, regular rhythm, S1 normal, S2 normal and normal heart sounds.   Pulmonary/Chest: Effort normal. She has wheezes (mostly clears with cough) in the right upper field and the left upper field. She has no rhonchi. She has no rales.   Harsh bronchospastic cough   Lymphadenopathy:     She has no cervical adenopathy.   Neurological: She is alert and oriented to person, place, and time. Coordination and gait normal.   Skin: Skin is warm, dry and intact.   Psychiatric: She has a normal mood and affect. Her speech is normal and behavior is normal.   Vitals reviewed.      Assessment/Plan     Adele was seen today for sore throat and cough.    Diagnoses and all orders for this visit:    Acute bronchitis,  unspecified organism  -     POC Influenza A / B    Acute SHARAD (middle ear effusion), bilateral    Acute pharyngitis, unspecified etiology    Other orders  -     methylPREDNISolone (MEDROL, ELLEN,) 4 MG tablet; Take as directed on package instructions.  -     azithromycin (ZITHROMAX Z-ELLEN) 250 MG tablet; Take 2 tablets the first day, then 1 tablet daily for 4 days.  -     guaiFENesin (MUCINEX) 600 MG 12 hr tablet; Take 2 tablets by mouth 2 (Two) Times a Day As Needed for Cough or Congestion.      Lab Results   Component Value Date    RAPFLUA Negative 01/13/2020    RAPFLUB Negative 01/13/2020     Risks and benefits of steroids discussed. Take only as prescribed.     Increase fluid intake.     Take Mucinex (guaifenesin) as prescribed to thin drainage/sputum.     If no improvement after 3-4 days or if worse at any time please follow up with your primary care provider or go to urgent care.

## 2020-09-03 ENCOUNTER — OFFICE VISIT (OUTPATIENT)
Dept: URGENT CARE | Age: 45
End: 2020-09-03
Payer: COMMERCIAL

## 2020-09-03 ENCOUNTER — OFFICE VISIT (OUTPATIENT)
Age: 45
End: 2020-09-03

## 2020-09-03 VITALS
RESPIRATION RATE: 18 BRPM | DIASTOLIC BLOOD PRESSURE: 88 MMHG | OXYGEN SATURATION: 98 % | TEMPERATURE: 96.9 F | SYSTOLIC BLOOD PRESSURE: 138 MMHG | HEART RATE: 68 BPM

## 2020-09-03 VITALS — OXYGEN SATURATION: 98 % | TEMPERATURE: 96.9 F | HEART RATE: 68 BPM

## 2020-09-03 LAB — S PYO AG THROAT QL: NORMAL

## 2020-09-03 PROCEDURE — 99203 OFFICE O/P NEW LOW 30 MIN: CPT | Performed by: NURSE PRACTITIONER

## 2020-09-03 PROCEDURE — 87880 STREP A ASSAY W/OPTIC: CPT | Performed by: NURSE PRACTITIONER

## 2020-09-03 ASSESSMENT — ENCOUNTER SYMPTOMS
ABDOMINAL PAIN: 0
ALLERGIC/IMMUNOLOGIC NEGATIVE: 1
SORE THROAT: 1
SHORTNESS OF BREATH: 0
NAUSEA: 1
COUGH: 0
DIARRHEA: 0
CHEST TIGHTNESS: 1
VOMITING: 0
EYES NEGATIVE: 1
SINUS PRESSURE: 0

## 2020-09-03 ASSESSMENT — VISUAL ACUITY: OU: 1

## 2020-09-03 NOTE — PROGRESS NOTES
[facility]  [address] [facility] [address] [id number]  Dept: [phone]  Dept Fax: [phone]: [phone]      Sophia Tubbs is c/o of Pharyngitis (x2 days)        HPI:     Rajat Prather is here with complaint of sore throat for 2 days. This morning she feels like her chest is getting congested and she has had some nausea. She says she is nervous and worried about COVID. 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 She has taken nothing OTC for symptoms and is eating and drinking well. She denies fever,chills or body aches. Relevant PMH: No pertinent PMH. Smoking history:  She  reports that she has never smoked. She has never used smokeless tobacco.     She has had no known ill contacts. Treatment to date: none. Recent travel or possible COVID exposure:no    Past Medical History:   Diagnosis Date    IUD (intrauterine device) in place 10/31/2019    Obesity affecting pregnancy in first trimester 5/16/2019    Ovarian cyst       Current Outpatient Medications   Medication Sig Dispense Refill    phentermine 37.5 MG capsule Take 37.5 mg by mouth every morning.  loratadine (CLARITIN) 10 MG capsule Take 10 mg by mouth daily      nystatin (MYCOSTATIN) 006007 UNIT/GM powder Apply 3 times daily. (Patient not taking: Reported on 9/3/2020) 1 Bottle 1    ibuprofen (ADVIL;MOTRIN) 800 MG tablet Take 1 tablet by mouth every 8 hours as needed for Pain (Patient not taking: Reported on 9/3/2020) 20 tablet 0    Prenatal Multivit-Min-Fe-FA (PRENATAL VITAMINS PO) Take by mouth       No current facility-administered medications for this visit.       No Known Allergies    Health Maintenance   Topic Date Due    Diabetic foot exam  07/26/1986    Diabetic retinal exam  07/26/1986    Lipid screen  07/26/1986    Diabetic microalbuminuria test  07/26/1994    DTaP/Tdap/Td vaccine (1 - Tdap) 07/26/1995    A1C test (Diabetic or Prediabetic)  05/16/2020    Flu vaccine (1) 09/01/2020    Cervical cancer screen  10/03/2024    HIV screen  Completed    Hepatitis A vaccine  Aged Out    Hepatitis B vaccine  Aged Out    Hib vaccine  Aged Out    Meningococcal (ACWY) vaccine  Aged Out    Pneumococcal 0-64 years Vaccine  Aged Out       Subjective:      Review of Systems   Constitutional: Negative for activity change, appetite change, chills and fever. HENT: Positive for congestion and sore throat. Negative for ear discharge and sinus pressure. Eyes: Negative. Respiratory: Positive for chest tightness. Negative for cough and shortness of breath. Cardiovascular: Negative. Gastrointestinal: Positive for nausea. Negative for abdominal pain, diarrhea and vomiting. Musculoskeletal: Negative for arthralgias and myalgias. Skin: Negative for rash. Allergic/Immunologic: Negative. Neurological: Negative for headaches. Psychiatric/Behavioral: The patient is nervous/anxious. Objective:     Physical Exam  Vitals signs and nursing note reviewed. Constitutional:       General: She is awake. She is not in acute distress. Appearance: Normal appearance. She is well-developed and well-groomed. She is morbidly obese. She is not ill-appearing or toxic-appearing. HENT:      Head: Normocephalic. Right Ear: Hearing, tympanic membrane, ear canal and external ear normal.      Left Ear: Hearing, tympanic membrane, ear canal and external ear normal.      Nose: Nose normal.      Right Sinus: No maxillary sinus tenderness or frontal sinus tenderness. Left Sinus: No maxillary sinus tenderness or frontal sinus tenderness. Mouth/Throat:      Lips: Pink. Mouth: Mucous membranes are moist.      Pharynx: Oropharynx is clear. Uvula midline. Posterior oropharyngeal erythema present. Tonsils: 0 on the right. 0 on the left.       Comments: Post nasal drip thick white  Eyes:      General: Vision grossly intact. Neck:      Musculoskeletal: Neck supple. Trachea: Phonation normal.   Cardiovascular:      Rate and Rhythm: Normal rate and regular rhythm. Heart sounds: Normal heart sounds, S1 normal and S2 normal. No murmur. No friction rub. No gallop. Pulmonary:      Effort: Pulmonary effort is normal. No respiratory distress. Breath sounds: Normal breath sounds and air entry. No wheezing, rhonchi or rales. Abdominal:      Palpations: Abdomen is soft. Musculoskeletal:         General: No tenderness or deformity. Lymphadenopathy:      Head:      Right side of head: No tonsillar adenopathy. Left side of head: No tonsillar adenopathy. Skin:     General: Skin is warm and dry. Capillary Refill: Capillary refill takes less than 2 seconds. Neurological:      General: No focal deficit present. Mental Status: She is alert, oriented to person, place, and time and easily aroused. Mental status is at baseline. Psychiatric:         Attention and Perception: Attention normal.         Mood and Affect: Mood normal.         Speech: Speech normal.         Behavior: Behavior normal. Behavior is cooperative. /88   Pulse 68   Temp 96.9 °F (36.1 °C)   Resp 18   SpO2 98%   Results for orders placed or performed in visit on 09/03/20   POCT rapid strep A   Result Value Ref Range    Strep A Ag None Detected None Detected       Assessment/ Plan     ASSESSMENT:         Diagnosis Orders   1. Sore throat  POCT rapid strep A   2. Chest congestion     3. Nausea         PLAN:   Plenty of fluids  Rest  OTC Tylenol as needed  Work note for 14 days  OTC Claritin or Zyrtec daily  Follow up with PCP or return to Flu clinic for worsening or unresolved symptoms. Based on patient's symptoms today, it is highly suspected that they have COVID 19. Since pt is being tested for COVID pt has been instructed to quarantine from contacts until testing has been resulted.  Further instructions will follow, as of now, this is 14 days unless otherwise specified when results are back. If SOB or worsening sx's develop, need to go to ED or return to clinic, pt voiced understanding. Pt was given printed instructions today on Possible COVID-19 infection with self-quarantine and management of symptoms    Call or return to clinic prn if these symptoms worsen or fail to improve as anticipated. Return if symptoms worsen or fail to improve. Patient given educational materials - see patient instructions. Discussed use, benefit, and side effects of prescribed medications. All patient questions answered. Pt voiced understanding. Patient agreed with treatment plan.  Follow up as needed      Electronically signed by NEHEMIAH Rodriguez CNP on 9/3/2020 at 9:25 AM

## 2020-09-03 NOTE — PATIENT INSTRUCTIONS
Plenty of fluids  Rest  OTC Tylenol as needed  Work note for 14 days  OTC Claritin or Zyrtec daily  Follow up with PCP or return to Flu clinic for worsening or unresolved symptoms. Patient Education        Learning About Coronavirus [phone])  Coronavirus [phone]): Overview  What is coronavirus (OLSDK-49)? The coronavirus disease (COVID-19) is caused by a virus. It is an illness that was first found in [place], [place], in December 2019. It has since spread worldwide. The virus can cause fever, cough, and trouble breathing. In severe cases, it can cause pneumonia and make it hard to breathe without help. It can cause death. Coronaviruses are a large group of viruses. They cause the common cold. They also cause more serious illnesses like Middle East respiratory syndrome (MERS) and severe acute respiratory syndrome (SARS). COVID-19 is caused by a novel coronavirus. That means it's a new type that has not been seen in people before. This virus spreads person-to-person through droplets from coughing and sneezing. It can also spread when you are close to someone who is infected. And it can spread when you touch something that has the virus on it, such as a doorknob or a tabletop. What can you do to protect yourself from coronavirus (COVID-19)? The best way to protect yourself from getting sick is to:  · Avoid areas where there is an outbreak. · Avoid contact with people who may be infected. · Wash your hands often with soap or alcohol-based hand sanitizers. · Avoid crowds and try to stay at least 6 feet away from other people. · Wash your hands often, especially after you cough or sneeze. Use soap and water, and scrub for at least 20 seconds. If soap and water aren't available, use an alcohol-based hand . · Avoid touching your mouth, nose, and eyes. What can you do to avoid spreading the virus to others?   To help avoid spreading the virus to others:  · Cover your mouth with a tissue when you cough or sneeze. Then throw the tissue in the trash. · Use a disinfectant to clean things that you touch often. · Wear a cloth face cover if you have to go to public areas. · Stay home if you are sick or have been exposed to the virus. Don't go to school, work, or public areas. And don't use public transportation, ride-shares, or taxis unless you have no choice. · If you are sick:  ? Leave your home only if you need to get medical care. But call the doctor's office first so they know you're coming. And wear a face cover. ? Wear the face cover whenever you're around other people. It can help stop the spread of the virus when you cough or sneeze. ? Clean and disinfect your home every day. Use household  and disinfectant wipes or sprays. Take special care to clean things that you grab with your hands. These include doorknobs, remote controls, phones, and handles on your refrigerator and microwave. And don't forget countertops, tabletops, bathrooms, and computer keyboards. When to call for help  [id number] anytime you think you may need emergency care. For example, call if:  · You have severe trouble breathing. (You can't talk at all.)  · You have constant chest pain or pressure. · You are severely dizzy or lightheaded. · You are confused or can't think clearly. · Your face and lips have a blue color. · You pass out (lose consciousness) or are very hard to wake up. Call your doctor now if you develop symptoms such as:  · Shortness of breath. · Fever. · Cough. If you need to get care, call ahead to the doctor's office for instructions before you go. Make sure you wear a face cover to prevent exposing other people to the virus. Where can you get the latest information? The following health organizations are tracking and studying this virus. Their websites contain the most up-to-date information. Nicolette Kwok also learn what to do if you think you may have been exposed to the virus. · [place]  [facility] for Disease Control and Prevention (CDC): The CDC provides updated news about the disease and travel advice. The website also tells you how to prevent the spread of infection. www.cdc.gov  · World Health Organization [facility]): WHO offers information about the virus outbreaks. WHO also has travel advice. www.who.int  Current as of: May 8, 2020               Content Version: 12.5  © 2006-2020 [employer]. Care instructions adapted under license by [facility] ([facility]). If you have questions about a medical condition or this instruction, always ask your healthcare professional. [place] [address] any warranty or liability for your use of this information. Patient Education        Coronavirus (IXCFR-58): Care Instructions  Overview  The coronavirus disease (COVID-19) is caused by a virus. Symptoms may include a fever, a cough, and shortness of breath. It mainly spreads person-to-person through droplets from coughing and sneezing. The virus also can spread when people are in close contact with someone who is infected. Most people have mild symptoms and can take care of themselves at home. If their symptoms get worse, they may need care in a hospital. There is no medicine to fight the virus. It's important to not spread the virus to others. If you have COVID-19, wear a face cover anytime you are around other people. You need to isolate yourself while you are sick. Your doctor or local public health official will tell you when you no longer need to be isolated. Leave your home only if you need to get medical care. Follow-up care is a key part of your treatment and safety. Be sure to make and go to all appointments, and call your doctor if you are having problems. It's also a good idea to know your test results and keep a list of the medicines you take. How can you care for yourself at home? · Get extra rest. It can help you feel better. · Drink plenty of fluids.  This helps replace fluids lost from fever. Fluids also help ease a scratchy throat. Water, soup, fruit juice, and hot tea with lemon are good choices. · Take acetaminophen (such as Tylenol) to reduce a fever. It may also help with muscle aches. Read and follow all instructions on the label. · Sponge your body with lukewarm water to help with fever. Don't use cold water or ice. · Use petroleum jelly on sore skin. This can help if the skin around your nose and lips becomes sore from rubbing a lot with tissues. Tips for isolation  · Wear a cloth face cover when you are around other people. It can help stop the spread of the virus when you cough or sneeze. · Limit contact with people in your home. If possible, stay in a separate bedroom and use a separate bathroom. · If you have to leave home, avoid crowds and try to stay at least 6 feet away from other people. · Avoid contact with pets and other animals. · Cover your mouth and nose with a tissue when you cough or sneeze. Then throw it in the trash right away. · Wash your hands often, especially after you cough or sneeze. Use soap and water, and scrub for at least 20 seconds. If soap and water aren't available, use an alcohol-based hand . · Don't share personal household items. These include bedding, towels, cups and glasses, and eating utensils. · [address] in the warmest water allowed for the fabric type, and dry it completely. It's okay to wash other people's laundry with yours. · Clean and disinfect your home every day. Use household  and disinfectant wipes or sprays. Take special care to clean things that you grab with your hands. These include doorknobs, remote controls, phones, and handles on your refrigerator and microwave. And don't forget countertops, tabletops, bathrooms, and computer keyboards. When should you call for help? [id number] anytime you think you may need emergency care.  For example, call if you have life-threatening symptoms, such as:  · You have

## 2020-09-03 NOTE — PROGRESS NOTES
Patient swabbed for COVID-19 using [employer] vendor and was evaluated inside the clinic. Patient specimen collected in drive through in patients private vehicle due to order present from primary care provider. Patient was evaluated by flu clinic provider.

## 2020-09-04 LAB — SARS-COV-2, NAA: NOT DETECTED

## 2020-10-02 NOTE — PATIENT INSTRUCTIONS
feel your breast tissue before moving on to the next spot. ? Check your entire breast, moving up and down as if following a strip from the collarbone to the bra line, and from the armpit to the ribs. Repeat until you have covered the entire breast.  ? Repeat this procedure for your left breast, using the pads of the 3 middle fingers of your right hand. · To examine your breasts while in the shower:  ? Place one arm over your head and lightly soap your breast on that side. ? Using the pads of your fingers, gently move your hand over your breast (in the strip pattern described above), feeling carefully for any lumps or changes. ? Repeat for the other breast.  · Have your doctor inspect anything you notice to see if you need further testing. Where can you learn more? Go to https://chlenineb.[employer]. org and sign in to your [employer] account. Enter P148 in the [employer] box to learn more about \"Breast Self-Exam: Care Instructions. \"     If you do not have an account, please click on the \"Sign Up Now\" link. Current as of: August 22, 2019               Content Version: 12.5  © 9686-0700 Healthwise, Incorporated. Care instructions adapted under license by [facility] ([facility]). If you have questions about a medical condition or this instruction, always ask your healthcare professional. [address] any warranty or liability for your use of this information. Patient Education        Body Mass Index: Care Instructions  Your Care Instructions     Body mass index (BMI) can help you see if your weight is raising your risk for health problems. It uses a formula to compare how much you weigh with how tall you are. · A BMI lower than 18.5 is considered underweight. · A BMI between 18.5 and 24.9 is considered healthy. · A BMI between 25 and 29.9 is considered overweight. A BMI of 30 or higher is considered obese.   If your BMI is in the normal range, it means that you have a lower risk for weight-related health problems. If your BMI is in the overweight or obese range, you may be at increased risk for weight-related health problems, such as high blood pressure, heart disease, stroke, arthritis or joint pain, and diabetes. If your BMI is in the underweight range, you may be at increased risk for health problems such as fatigue, lower protection (immunity) against illness, muscle loss, bone loss, hair loss, and hormone problems. BMI is just one measure of your risk for weight-related health problems. You may be at higher risk for health problems if you are not active, you eat an unhealthy diet, or you drink too much alcohol or use tobacco products. Follow-up care is a key part of your treatment and safety. Be sure to make and go to all appointments, and call your doctor if you are having problems. It's also a good idea to know your test results and keep a list of the medicines you take. How can you care for yourself at home? · Practice healthy eating habits. This includes eating plenty of fruits, vegetables, whole grains, lean protein, and low-fat dairy. · If your doctor recommends it, get more exercise. Walking is a good choice. Bit by bit, increase the amount you walk every day. Try for at least 30 minutes on most days of the week. · Do not smoke. Smoking can increase your risk for health problems. If you need help quitting, talk to your doctor about stop-smoking programs and medicines. These can increase your chances of quitting for good. · Limit alcohol to 2 drinks a day for men and 1 drink a day for women. Too much alcohol can cause health problems. If you have a BMI higher than 25  · Your doctor may do other tests to check your risk for weight-related health problems. This may include measuring the distance around your waist. A waist measurement of more than 40 inches in men or 35 inches in women can increase the risk of weight-related health problems.   · Talk with your doctor about steps you can take to stay healthy or improve your health. You may need to make lifestyle changes to lose weight and stay healthy, such as changing your diet and getting regular exercise. If you have a BMI lower than 18.5  · Your doctor may do other tests to check your risk for health problems. · Talk with your doctor about steps you can take to stay healthy or improve your health. You may need to make lifestyle changes to gain or maintain weight and stay healthy, such as getting more healthy foods in your diet and doing exercises to build muscle. Where can you learn more? Go to https://pretty.[employer]. org and sign in to your [employer] account. Enter S176 in the handsome[employer]utive box to learn more about \"Body Mass Index: Care Instructions. \"     If you do not have an account, please click on the \"Sign Up Now\" link. Current as of: December 11, 2019               Content Version: 12.5  © 6035-2073 [employer]. Care instructions adapted under license by [facility] ([facility]). If you have questions about a medical condition or this instruction, always ask your healthcare professional. [address] any warranty or liability for your use of this information. Patient Education        A Healthy Lifestyle: Care Instructions  Your Care Instructions     A healthy lifestyle can help you feel good, stay at a healthy weight, and have plenty of energy for both work and play. A healthy lifestyle is something you can share with your whole family. A healthy lifestyle also can lower your risk for serious health problems, such as high blood pressure, heart disease, and diabetes. You can follow a few steps listed below to improve your health and the health of your family. Follow-up care is a key part of your treatment and safety. Be sure to make and go to all appointments, and call your doctor if you are having problems.  It's also a good idea to know your test results and keep a list of the medicines you take. How can you care for yourself at home? · Do not eat too much sugar, fat, or fast foods. You can still have dessert and treats now and then. The goal is moderation. · Start small to improve your eating habits. Pay attention to portion sizes, drink less juice and soda pop, and eat more fruits and vegetables. ? Eat a healthy amount of food. A 3-ounce serving of meat, for example, is about the size of a deck of cards. Fill the rest of your plate with vegetables and whole grains. ? Limit the amount of soda and sports drinks you have every day. Drink more water when you are thirsty. ? Eat at least 5 servings of fruits and vegetables every day. It may seem like a lot, but it is not hard to reach this goal. A serving or helping is 1 piece of fruit, 1 cup of vegetables, or 2 cups of leafy, raw vegetables. Have an apple or some carrot sticks as an afternoon snack instead of a candy bar. Try to have fruits and/or vegetables at every meal.  · Make exercise part of your daily routine. You may want to start with simple activities, such as walking, bicycling, or slow swimming. Try to be active 30 to 60 minutes every day. You do not need to do all 30 to 60 minutes all at once. For example, you can exercise 3 times a day for 10 or 20 minutes. Moderate exercise is safe for most people, but it is always a good idea to talk to your doctor before starting an exercise program.  · Keep moving. Zarina Longest the lawn, work in the garden, or [employer]. Take the stairs instead of the elevator at work. · If you smoke, quit. People who smoke have an increased risk for heart attack, stroke, cancer, and other lung illnesses. Quitting is hard, but there are ways to boost your chance of quitting tobacco for good. ? Use nicotine gum, patches, or lozenges. ? Ask your doctor about stop-smoking programs and medicines. ? Keep trying.   In addition to reducing your risk of diseases in the future, you will notice some benefits soon after you stop using tobacco. If you have shortness of breath or asthma symptoms, they will likely get better within a few weeks after you quit. · Limit how much alcohol you drink. Moderate amounts of alcohol (up to 2 drinks a day for men, 1 drink a day for women) are okay. But drinking too much can lead to liver problems, high blood pressure, and other health problems. Family health  If you have a family, there are many things you can do together to improve your health. · Eat meals together as a family as often as possible. · Eat healthy foods. This includes fruits, vegetables, lean meats and dairy, and whole grains. · Include your family in your fitness plan. Most people think of activities such as jogging or tennis as the way to fitness, but there are many ways you and your family can be more active. Anything that makes you breathe hard and gets your heart pumping is exercise. Here are some tips:  ? Walk to do errands or to take your child to school or the bus.  ? Go for a family bike ride after dinner instead of watching TV. Where can you learn more? Go to https://[employer] Wellness.[employer]. org and sign in to your [employer] account. Enter I803 in the [facility] box to learn more about \"A Healthy Lifestyle: Care Instructions. \"     If you do not have an account, please click on the \"Sign Up Now\" link. Current as of: January 31, 2020               Content Version: 12.5  © 9260-4089 Healthwise, Incorporated. Care instructions adapted under license by [facility] ([facility]). If you have questions about a medical condition or this instruction, always ask your healthcare professional. [place] [address] any warranty or liability for your use of this information.

## 2020-10-02 NOTE — PROGRESS NOTES
Pt presents today for pap smear and breast exam.  She also complains of     Last mammogram: 10-3-2019  Last pap smear:  10-3-2019, negative  Contraception:  IUD  :  6  Para: 3   AB:  3  Last bone density:  never  Last colonoscopy: never  Menarche:   LMP:

## 2020-10-05 ENCOUNTER — OFFICE VISIT (OUTPATIENT)
Dept: OBGYN | Age: 45
End: 2020-10-05
Payer: COMMERCIAL

## 2020-10-05 VITALS
HEIGHT: 62 IN | SYSTOLIC BLOOD PRESSURE: 122 MMHG | WEIGHT: 260 LBS | BODY MASS INDEX: 47.84 KG/M2 | DIASTOLIC BLOOD PRESSURE: 82 MMHG

## 2020-10-05 PROBLEM — Z97.5 IUD (INTRAUTERINE DEVICE) IN PLACE: Status: ACTIVE | Noted: 2020-10-05

## 2020-10-05 PROCEDURE — 99213 OFFICE O/P EST LOW 20 MIN: CPT | Performed by: ADVANCED PRACTICE MIDWIFE

## 2020-10-05 PROCEDURE — 99396 PREV VISIT EST AGE 40-64: CPT | Performed by: ADVANCED PRACTICE MIDWIFE

## 2020-10-05 ASSESSMENT — ENCOUNTER SYMPTOMS
GASTROINTESTINAL NEGATIVE: 1
RESPIRATORY NEGATIVE: 1
ALLERGIC/IMMUNOLOGIC NEGATIVE: 1
BACK PAIN: 1
EYES NEGATIVE: 1

## 2020-10-05 NOTE — PROGRESS NOTES
[address] OB/GYN  [facility] Office Note    Joslyn Kitchen is a 41 y.o. female who presents today for her medical conditions/ complaints as noted below. Chief Complaint   Patient presents with    Annual Exam         HPI  Rachel Loza presents for annual gyn exam. She reports heavy periods with IUD in place. She also c/o unique symptoms of \"thumb pain\" around her periods. She states this started after IUD placement. 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 Sexual concerns include pain in left adnexa with orgasm. She also c/o \"grooves in shoulders from weight of breast and rash/sores under panus\". She would like information on surgical breast reduction and tummy tuck. Last mammogram: 10-3-2019  Last pap smear:  10-3-2019, negative  Contraception:  IUD  :  6  Para: 3   AB:  3  Last bone density:  never  Last colonoscopy: never  Menarche:   LMP:   Menses:   Patient Active Problem List   Diagnosis    IUD (intrauterine device) in place       Patient's last menstrual period was 2020. [id number]    Past Medical History:   Diagnosis Date    IUD (intrauterine device) in place 10/31/2019    Obesity affecting pregnancy in first trimester 2019    Ovarian cyst      Past Surgical History:   Procedure Laterality Date    DENTAL SURGERY      DILATION AND CURETTAGE      DILATION AND CURETTAGE OF UTERUS      HYSTEROSCOPY      OVARIAN CYST SURGERY       Family History   Problem Relation Age of Onset    Colon Cancer Mother 54     Social History     Tobacco Use    Smoking status: Never Smoker    Smokeless tobacco: Never Used   Substance Use Topics    Alcohol use: No       Current Outpatient Medications   Medication Sig Dispense Refill    nystatin (MYCOSTATIN) 411998 UNIT/GM powder Apply 3 times daily. (Patient not taking: Reported on 9/3/2020) 1 Bottle 1     No current facility-administered medications for this visit.       No Known Allergies  Vitals:    10/05/20 1018   BP: 122/82 Body mass index is 48.33 kg/m². Review of Systems   Constitutional: Negative. HENT: Negative. Eyes: Negative. Respiratory: Negative. Cardiovascular: Negative. Gastrointestinal: Negative. Endocrine: Negative. Genitourinary: Positive for menstrual problem (heavy periods) and pelvic pain (left sided). Musculoskeletal: Positive for arthralgias (thumbs) and back pain (upper and lower). Skin: Negative. Rash/sores beneath panus   Allergic/Immunologic: Negative. Neurological: Negative. Hematological: Negative. Psychiatric/Behavioral: Negative. Physical Exam  Constitutional:       Appearance: She is well-developed. HENT:      Head: Normocephalic and atraumatic. Eyes:      Conjunctiva/sclera: Conjunctivae normal.      Pupils: Pupils are equal, round, and reactive to light. Neck:      Musculoskeletal: Normal range of motion and neck supple. Thyroid: No thyromegaly. Trachea: No tracheal deviation. Comments: Grooves note bilaterally on shoulders from bra strap  Cardiovascular:      Rate and Rhythm: Normal rate and regular rhythm. Heart sounds: Normal heart sounds. No murmur. Pulmonary:      Effort: Pulmonary effort is normal. No respiratory distress. Breath sounds: Normal breath sounds. Chest:      Comments: Breasts symmetrical without tenderness, masses, or nipple discharge. Nipples everted bilaterally. Abdominal:      General: There is no distension. Palpations: Abdomen is soft. Tenderness: There is no abdominal tenderness. There is no guarding. Genitourinary:     Vagina: Normal.      Cervix: No cervical motion tenderness, discharge or friability. Uterus: Normal.       Adnexa:         Right: No mass, tenderness or fullness. Left: No mass, tenderness or fullness. Comments: EGBUS normal. Vulva reveals no erythema, lesions or atrophic changes. Vagina normal, no lesions, polyps or discharge.  Cervix is normal, smooth pink mucosa. No Lesions, no polyps. No IUD strings visualized. Musculoskeletal: Normal range of motion. Skin:     General: Skin is warm and dry. Capillary Refill: Capillary refill takes less than 2 seconds. Neurological:      Mental Status: She is alert and oriented to person, place, and time. Psychiatric:         Behavior: Behavior normal.         Thought Content: Thought content normal.         Judgment: Judgment normal.          Diagnosis Orders   1. Well woman exam     2. Encounter for screening mammogram for breast cancer  HEATH DIGITAL SCREEN W OR WO CAD BILATERAL   3. IUD (intrauterine device) in place         MEDICATIONS:  No orders of the defined types were placed in this encounter. ORDERS:  No orders of the defined types were placed in this encounter. PLAN:  1. IUD - no strings - I have ordered u/s to verify placement. Given the hx of heavy periods I am suspicious it has moved or dislodged. If not high in uterus, she may consider removal.  2. Reconstruction surgery - Due to her complaints, would recommend breast reduction and abdominoplasty. 3. WWE - No pap indicated. SBE reviewed and CBE performed. No annual labs today.

## 2020-10-05 NOTE — PROGRESS NOTES
Pt states since she had IUD placed her thumbs feel like they are going to fall off. Pt c/o pain during orgasm on left side.

## 2020-10-06 ENCOUNTER — HOSPITAL ENCOUNTER (OUTPATIENT)
Dept: ULTRASOUND IMAGING | Age: 45
Discharge: HOME | End: 2020-10-06
Payer: COMMERCIAL

## 2020-10-06 ENCOUNTER — TELEPHONE (OUTPATIENT)
Dept: OBGYN | Age: 45
End: 2020-10-06

## 2020-10-06 ENCOUNTER — OFFICE VISIT (OUTPATIENT)
Dept: OBGYN | Age: 45
End: 2020-10-06
Payer: COMMERCIAL

## 2020-10-06 VITALS
WEIGHT: 258 LBS | HEART RATE: 83 BPM | BODY MASS INDEX: 48.71 KG/M2 | HEIGHT: 61 IN | SYSTOLIC BLOOD PRESSURE: 130 MMHG | DIASTOLIC BLOOD PRESSURE: 95 MMHG

## 2020-10-06 PROCEDURE — 76830 TRANSVAGINAL US NON-OB: CPT

## 2020-10-06 PROCEDURE — 58301 REMOVE INTRAUTERINE DEVICE: CPT | Performed by: ADVANCED PRACTICE MIDWIFE

## 2020-10-06 PROCEDURE — 99213 OFFICE O/P EST LOW 20 MIN: CPT | Performed by: ADVANCED PRACTICE MIDWIFE

## 2020-10-06 ASSESSMENT — ENCOUNTER SYMPTOMS
GASTROINTESTINAL NEGATIVE: 1
RESPIRATORY NEGATIVE: 1
EYES NEGATIVE: 1
ALLERGIC/IMMUNOLOGIC NEGATIVE: 1

## 2020-10-06 NOTE — TELEPHONE ENCOUNTER
----- Message from NEHEMIAH Han CNM sent at 10/6/2020  2:14 PM CDT -----  Regarding: ablation  Pt would like surgical consult for ablation. She has had IUD in and continued to have period. She asked that we remove the IUD. I did not collect endobx as she has had the IUD in place. Just let me know and I can get that if they want me to. Whoever can get her in first is fine.

## 2020-10-06 NOTE — PROGRESS NOTES
[facility]  CNM Office Note    Kimani Ren is a 41 y.o. female who presents today for her medical conditions/ complaints as noted below. Chief Complaint   Patient presents with    Follow-up         HPI  Marisue Denver presents for IUD removal and review of u/s. IUD was confirmed in uterus. Given her repeated heavy periods she would like ablation. She requests a surgical consult. Patient Active Problem List   Diagnosis    IUD (intrauterine device) in place       Patient's last menstrual period was 09/28/2020. [id number]    Past Medical History:   Diagnosis Date    IUD (intrauterine device) in place 10/31/2019    Obesity affecting pregnancy in first trimester 5/16/2019    Ovarian cyst      Past Surgical History:   Procedure Laterality Date    DENTAL SURGERY      DILATION AND CURETTAGE      DILATION AND CURETTAGE OF UTERUS      HYSTEROSCOPY      OVARIAN CYST SURGERY       Family History   Problem Relation Age of Onset    Colon Cancer Mother 54     Social History     Tobacco Use    Smoking status: Never Smoker    Smokeless tobacco: Never Used   Substance Use Topics    Alcohol use: No       Current Outpatient Medications   Medication Sig Dispense Refill    nystatin (MYCOSTATIN) 473883 UNIT/GM powder Apply 3 times daily. 1 Bottle 1     No current facility-administered medications for this visit. No Known Allergies  Vitals:    10/06/20 1401   BP: (!) 130/95   Pulse: 83     Body mass index is 48.75 kg/m². Review of Systems   Constitutional: Negative. HENT: Negative. Eyes: Negative. Respiratory: Negative. Cardiovascular: Negative. Gastrointestinal: Negative. Endocrine: Negative. Genitourinary: Positive for menstrual problem (heavy periods). Musculoskeletal: Negative. Skin: Negative. Allergic/Immunologic: Negative. Neurological: Negative. Hematological: Negative. Psychiatric/Behavioral: Negative.         Physical Exam  Constitutional:       Appearance: She is

## 2020-10-14 ENCOUNTER — HOSPITAL ENCOUNTER (OUTPATIENT)
Dept: WOMENS IMAGING | Age: 45
Discharge: HOME | End: 2020-10-14
Payer: COMMERCIAL

## 2020-10-14 PROCEDURE — 77063 BREAST TOMOSYNTHESIS BI: CPT

## 2020-10-21 ENCOUNTER — OFFICE VISIT (OUTPATIENT)
Dept: OBGYN | Age: 45
End: 2020-10-21

## 2020-10-21 VITALS
BODY MASS INDEX: 49.09 KG/M2 | TEMPERATURE: 98.3 F | WEIGHT: 260 LBS | HEIGHT: 61 IN | DIASTOLIC BLOOD PRESSURE: 82 MMHG | HEART RATE: 89 BPM | SYSTOLIC BLOOD PRESSURE: 128 MMHG

## 2020-10-21 PROCEDURE — PREOPEXAM PRE-OP EXAM: Performed by: OBSTETRICS & GYNECOLOGY

## 2020-10-21 ASSESSMENT — ENCOUNTER SYMPTOMS
GASTROINTESTINAL NEGATIVE: 1
EYES NEGATIVE: 1
RESPIRATORY NEGATIVE: 1

## 2020-10-21 NOTE — PATIENT INSTRUCTIONS
it before your procedure. Make sure that you understand exactly what your doctor wants you to do. These medicines increase the risk of bleeding.     · Tell your doctor ALL the medicines, vitamins, supplements, and herbal remedies you take. Some may increase the risk of problems during your procedure. Your doctor will tell you if you should stop taking any of them before the procedure and how soon to do it.     · Make sure your doctor and the hospital have a copy of your advance directive. If you don't have one, you may want to prepare one. It lets others know your health care wishes. It's a good thing to have before any type of surgery or procedure. What happens on the day of the procedure? · Follow the instructions exactly about when to stop eating and drinking. If you don't, your procedure may be canceled. If your doctor told you to take your medicines on the day of the procedure, take them with only a sip of water.     · Take a bath or shower before you come in for your procedure. Do not apply lotions, perfumes, deodorants, or nail polish.     · Take off all jewelry and piercings. And take out contact lenses, if you wear them. At the doctor's office or hospital   · Bring a picture ID.     · You will be kept comfortable and safe by your anesthesia provider. You may get medicine that relaxes you or puts you in a light sleep.     · The procedure will take less than an hour. When should you call your doctor? · You have questions or concerns.     · You do not understand how to prepare for your procedure.     · You become ill before the procedure (such as fever, flu, or a cold).     · You need to reschedule or have changed your mind about having the procedure. Where can you learn more? Go to [email or web address].[email or web address] org and sign in to your [employer] account. Enter D429 in the GoVoluntr box to learn more about \"Endometrial Ablation: Before Your Procedure. \"     If you do not have an account, please click on the \"Sign Up Now\" link. Current as of: November 8, 2019               Content Version: 12.6  © 3610-6734 [employer]. Care instructions adapted under license by [facility] ([facility]). If you have questions about a medical condition or this instruction, always ask your healthcare professional. [address] any warranty or liability for your use of this information.

## 2020-10-21 NOTE — PROGRESS NOTES
Cheryl LOWERY RN, am scribing for and in the presence of Dr. Wiliam Wood 10/21/2020/11:14 AM/sign    Subjective:      Patient ID: Betty Kruger is a 41 y.o. female. HPI    Patient is here for a preop visit. She is having a D&C, hysteroscopy, and Novasure ablation on 10/30/2020. She had IUD removed about 2 weeks ago due to heavy vaginal bleeding. The bleeding has persisted and pt desires surgical intervention. Recent TVUS showed   The transvaginal sonography of the pelvis is performed. There is no    previous similar study available for comparison. The uterus measures 8.1 x 5.9 x 6.9 cm. An intrauterine contraceptive    device is seen in place. The endometrium could not be evaluated    optimally due to the presence of the IUD. There are 2 relatively hypoechoic solid nodules in the uterus. There    is a nodule measuring 1.8 x 1.9 x 1.8 cm in the posterior wall. There    is a nodule measuring 1.9 x 1.6 x 1.7 cm in the fundus of the uterus. The right ovary measures 2.7 x 1.6 x 1.5 cm. There is normal blood    flow. There are small hypoechoic nodules representing follicular    cysts. No dominant follicle. There are small hyperechoic nodules/areas    measuring 5 to 6 mm in diameter in the right ovary which probably    represent hemorrhagic/complicated or partially collapsed cyst.    The left ovary measures 3.6 x 3 x 2.5 cm. There is normal blood flow. There is a dominant cyst in the left ovary measuring 2.6 x 1.9 x 2 cm. Betty Kruger is a 41 y.o. female with the following history as recorded in [facility]:  Patient Active Problem List    Diagnosis Date Noted    IUD (intrauterine device) in place 10/05/2020     No current outpatient medications on file. No current facility-administered medications for this visit. Allergies: Patient has no known allergies.   Past Medical History:   Diagnosis Date    IUD (intrauterine device) in place 10/31/2019    Obesity affecting pregnancy in first trimester extremities; normal gait   Skin:     General: Skin is warm and dry. Neurological:      Mental Status: She is alert and oriented to person, place, and time. Psychiatric:         Behavior: Behavior normal.         Assessment:       Diagnosis Orders   1. Menorrhagia with regular cycle             Plan:      Counseling was offered and accepted by patient. Discussed at length the risks, benefits and alternatives to surgery including medical management and no intervention. Some patients will need a full medical clearance. Preop testing ordered and we will review scheduling to determine date and time. Consents for surgery signed and all questions proceeding. All questions answered and patient voiced understanding of above. Xin Chew MD, personally performed services described in this document as scribed by Helen Felipe RN in my presence, and it is both accurate and complete.

## 2020-10-27 ENCOUNTER — HOSPITAL ENCOUNTER (OUTPATIENT)
Dept: PREADMISSION TESTING | Age: 45
Discharge: HOME | End: 2020-10-31
Payer: COMMERCIAL

## 2020-10-27 VITALS — HEIGHT: 61 IN | WEIGHT: 260 LBS | BODY MASS INDEX: 49.09 KG/M2

## 2020-10-27 LAB
BASOPHILS ABSOLUTE: 0 K/UL (ref 0–0.2)
BASOPHILS RELATIVE PERCENT: 0.5 % (ref 0–1)
EOSINOPHILS ABSOLUTE: 0.1 K/UL (ref 0–0.6)
EOSINOPHILS RELATIVE PERCENT: 0.8 % (ref 0–5)
HCT VFR BLD CALC: 47 % (ref 37–47)
HEMOGLOBIN: 15.8 G/DL (ref 12–16)
IMMATURE GRANULOCYTES #: 0 K/UL
LYMPHOCYTES ABSOLUTE: 2.3 K/UL (ref 1.1–4.5)
LYMPHOCYTES RELATIVE PERCENT: 27.1 % (ref 20–40)
MCH RBC QN AUTO: 29.4 PG (ref 27–31)
MCHC RBC AUTO-ENTMCNC: 33.6 G/DL (ref 33–37)
MCV RBC AUTO: 87.5 FL (ref 81–99)
MONOCYTES ABSOLUTE: 0.4 K/UL (ref 0–0.9)
MONOCYTES RELATIVE PERCENT: 4.9 % (ref 0–10)
NEUTROPHILS ABSOLUTE: 5.7 K/UL (ref 1.5–7.5)
NEUTROPHILS RELATIVE PERCENT: 66.5 % (ref 50–65)
PDW BLD-RTO: 12.3 % (ref 11.5–14.5)
PLATELET # BLD: 273 K/UL (ref 130–400)
PMV BLD AUTO: 10.9 FL (ref 9.4–12.3)
RBC # BLD: 5.37 M/UL (ref 4.2–5.4)
WBC # BLD: 8.6 K/UL (ref 4.8–10.8)

## 2020-10-27 PROCEDURE — 85025 COMPLETE CBC W/AUTO DIFF WBC: CPT

## 2020-10-29 LAB — SARS-COV-2, NAA: NOT DETECTED

## 2020-10-30 ENCOUNTER — HOSPITAL ENCOUNTER (OUTPATIENT)
Age: 45
Setting detail: OUTPATIENT SURGERY
Discharge: HOME | End: 2020-10-30
Attending: OBSTETRICS & GYNECOLOGY | Admitting: OBSTETRICS & GYNECOLOGY
Payer: COMMERCIAL

## 2020-10-30 ENCOUNTER — ANESTHESIA (OUTPATIENT)
Dept: OPERATING ROOM | Age: 45
End: 2020-10-30
Payer: COMMERCIAL

## 2020-10-30 ENCOUNTER — ANESTHESIA EVENT (OUTPATIENT)
Dept: OPERATING ROOM | Age: 45
End: 2020-10-30
Payer: COMMERCIAL

## 2020-10-30 VITALS
SYSTOLIC BLOOD PRESSURE: 131 MMHG | RESPIRATION RATE: 10 BRPM | DIASTOLIC BLOOD PRESSURE: 56 MMHG | TEMPERATURE: 97.2 F | OXYGEN SATURATION: 98 %

## 2020-10-30 VITALS
BODY MASS INDEX: 49.09 KG/M2 | WEIGHT: 260 LBS | OXYGEN SATURATION: 98 % | RESPIRATION RATE: 16 BRPM | HEART RATE: 59 BPM | HEIGHT: 61 IN | SYSTOLIC BLOOD PRESSURE: 157 MMHG | DIASTOLIC BLOOD PRESSURE: 103 MMHG | TEMPERATURE: 97.2 F

## 2020-10-30 PROBLEM — Z97.5 IUD (INTRAUTERINE DEVICE) IN PLACE: Status: RESOLVED | Noted: 2020-10-05 | Resolved: 2020-10-30

## 2020-10-30 LAB — HCG(URINE) PREGNANCY TEST: NEGATIVE

## 2020-10-30 PROCEDURE — 2709999900 HC NON-CHARGEABLE SUPPLY: Performed by: OBSTETRICS & GYNECOLOGY

## 2020-10-30 PROCEDURE — 3700000001 HC ADD 15 MINUTES (ANESTHESIA): Performed by: OBSTETRICS & GYNECOLOGY

## 2020-10-30 PROCEDURE — C1886 CATHETER, ABLATION: HCPCS | Performed by: OBSTETRICS & GYNECOLOGY

## 2020-10-30 PROCEDURE — 3600000004 HC SURGERY LEVEL 4 BASE: Performed by: OBSTETRICS & GYNECOLOGY

## 2020-10-30 PROCEDURE — 58563 HYSTEROSCOPY ABLATION: CPT | Performed by: OBSTETRICS & GYNECOLOGY

## 2020-10-30 PROCEDURE — 7100000010 HC PHASE II RECOVERY - FIRST 15 MIN: Performed by: OBSTETRICS & GYNECOLOGY

## 2020-10-30 PROCEDURE — 2500000003 HC RX 250 WO HCPCS: Performed by: NURSE ANESTHETIST, CERTIFIED REGISTERED

## 2020-10-30 PROCEDURE — 2580000003 HC RX 258: Performed by: ANESTHESIOLOGY

## 2020-10-30 PROCEDURE — 6360000002 HC RX W HCPCS: Performed by: OBSTETRICS & GYNECOLOGY

## 2020-10-30 PROCEDURE — 3700000000 HC ANESTHESIA ATTENDED CARE: Performed by: OBSTETRICS & GYNECOLOGY

## 2020-10-30 PROCEDURE — 88305 TISSUE EXAM BY PATHOLOGIST: CPT

## 2020-10-30 PROCEDURE — 6360000002 HC RX W HCPCS: Performed by: NURSE ANESTHETIST, CERTIFIED REGISTERED

## 2020-10-30 PROCEDURE — 7100000000 HC PACU RECOVERY - FIRST 15 MIN: Performed by: OBSTETRICS & GYNECOLOGY

## 2020-10-30 PROCEDURE — 84703 CHORIONIC GONADOTROPIN ASSAY: CPT

## 2020-10-30 PROCEDURE — 2580000003 HC RX 258: Performed by: OBSTETRICS & GYNECOLOGY

## 2020-10-30 PROCEDURE — 6370000000 HC RX 637 (ALT 250 FOR IP): Performed by: ANESTHESIOLOGY

## 2020-10-30 PROCEDURE — 3600000014 HC SURGERY LEVEL 4 ADDTL 15MIN: Performed by: OBSTETRICS & GYNECOLOGY

## 2020-10-30 PROCEDURE — 7100000001 HC PACU RECOVERY - ADDTL 15 MIN: Performed by: OBSTETRICS & GYNECOLOGY

## 2020-10-30 RX ORDER — PROMETHAZINE HYDROCHLORIDE 25 MG/ML
6.25 INJECTION, SOLUTION INTRAMUSCULAR; INTRAVENOUS
Status: DISCONTINUED | OUTPATIENT
Start: 2020-10-30 | End: 2020-10-30 | Stop reason: HOSPADM

## 2020-10-30 RX ORDER — LABETALOL HYDROCHLORIDE 5 MG/ML
5 INJECTION, SOLUTION INTRAVENOUS EVERY 10 MIN PRN
Status: DISCONTINUED | OUTPATIENT
Start: 2020-10-30 | End: 2020-10-30 | Stop reason: HOSPADM

## 2020-10-30 RX ORDER — MORPHINE SULFATE 4 MG/ML
4 INJECTION, SOLUTION INTRAMUSCULAR; INTRAVENOUS
Status: DISCONTINUED | OUTPATIENT
Start: 2020-10-30 | End: 2020-10-30 | Stop reason: HOSPADM

## 2020-10-30 RX ORDER — PROPOFOL 10 MG/ML
INJECTION, EMULSION INTRAVENOUS PRN
Status: DISCONTINUED | OUTPATIENT
Start: 2020-10-30 | End: 2020-10-30 | Stop reason: SDUPTHER

## 2020-10-30 RX ORDER — MORPHINE SULFATE 4 MG/ML
4 INJECTION, SOLUTION INTRAMUSCULAR; INTRAVENOUS EVERY 5 MIN PRN
Status: DISCONTINUED | OUTPATIENT
Start: 2020-10-30 | End: 2020-10-30 | Stop reason: HOSPADM

## 2020-10-30 RX ORDER — LIDOCAINE HYDROCHLORIDE 10 MG/ML
1 INJECTION, SOLUTION EPIDURAL; INFILTRATION; INTRACAUDAL; PERINEURAL
Status: DISCONTINUED | OUTPATIENT
Start: 2020-10-30 | End: 2020-10-30 | Stop reason: HOSPADM

## 2020-10-30 RX ORDER — MORPHINE SULFATE 4 MG/ML
2 INJECTION, SOLUTION INTRAMUSCULAR; INTRAVENOUS EVERY 5 MIN PRN
Status: DISCONTINUED | OUTPATIENT
Start: 2020-10-30 | End: 2020-10-30 | Stop reason: HOSPADM

## 2020-10-30 RX ORDER — LIDOCAINE HYDROCHLORIDE 10 MG/ML
INJECTION, SOLUTION EPIDURAL; INFILTRATION; INTRACAUDAL; PERINEURAL PRN
Status: DISCONTINUED | OUTPATIENT
Start: 2020-10-30 | End: 2020-10-30 | Stop reason: SDUPTHER

## 2020-10-30 RX ORDER — MEPERIDINE HYDROCHLORIDE 50 MG/ML
12.5 INJECTION INTRAMUSCULAR; INTRAVENOUS; SUBCUTANEOUS EVERY 5 MIN PRN
Status: DISCONTINUED | OUTPATIENT
Start: 2020-10-30 | End: 2020-10-30 | Stop reason: HOSPADM

## 2020-10-30 RX ORDER — HYDRALAZINE HYDROCHLORIDE 20 MG/ML
5 INJECTION INTRAMUSCULAR; INTRAVENOUS EVERY 10 MIN PRN
Status: DISCONTINUED | OUTPATIENT
Start: 2020-10-30 | End: 2020-10-30 | Stop reason: HOSPADM

## 2020-10-30 RX ORDER — METOCLOPRAMIDE HYDROCHLORIDE 5 MG/ML
10 INJECTION INTRAMUSCULAR; INTRAVENOUS
Status: DISCONTINUED | OUTPATIENT
Start: 2020-10-30 | End: 2020-10-30 | Stop reason: HOSPADM

## 2020-10-30 RX ORDER — SODIUM CHLORIDE 0.9 % (FLUSH) 0.9 %
10 SYRINGE (ML) INJECTION PRN
Status: DISCONTINUED | OUTPATIENT
Start: 2020-10-30 | End: 2020-10-30 | Stop reason: HOSPADM

## 2020-10-30 RX ORDER — HYDROMORPHONE HYDROCHLORIDE 1 MG/ML
0.5 INJECTION, SOLUTION INTRAMUSCULAR; INTRAVENOUS; SUBCUTANEOUS EVERY 5 MIN PRN
Status: DISCONTINUED | OUTPATIENT
Start: 2020-10-30 | End: 2020-10-30 | Stop reason: HOSPADM

## 2020-10-30 RX ORDER — MIDAZOLAM HYDROCHLORIDE 1 MG/ML
2 INJECTION INTRAMUSCULAR; INTRAVENOUS
Status: DISCONTINUED | OUTPATIENT
Start: 2020-10-30 | End: 2020-10-30 | Stop reason: HOSPADM

## 2020-10-30 RX ORDER — SODIUM CHLORIDE 0.9 % (FLUSH) 0.9 %
10 SYRINGE (ML) INJECTION EVERY 12 HOURS SCHEDULED
Status: DISCONTINUED | OUTPATIENT
Start: 2020-10-30 | End: 2020-10-30 | Stop reason: HOSPADM

## 2020-10-30 RX ORDER — DIPHENHYDRAMINE HYDROCHLORIDE 50 MG/ML
12.5 INJECTION INTRAMUSCULAR; INTRAVENOUS
Status: DISCONTINUED | OUTPATIENT
Start: 2020-10-30 | End: 2020-10-30 | Stop reason: HOSPADM

## 2020-10-30 RX ORDER — SCOLOPAMINE TRANSDERMAL SYSTEM 1 MG/1
1 PATCH, EXTENDED RELEASE TRANSDERMAL ONCE
Status: DISCONTINUED | OUTPATIENT
Start: 2020-10-30 | End: 2020-10-30 | Stop reason: HOSPADM

## 2020-10-30 RX ORDER — SODIUM CHLORIDE, SODIUM LACTATE, POTASSIUM CHLORIDE, CALCIUM CHLORIDE 600; 310; 30; 20 MG/100ML; MG/100ML; MG/100ML; MG/100ML
INJECTION, SOLUTION INTRAVENOUS CONTINUOUS
Status: DISCONTINUED | OUTPATIENT
Start: 2020-10-30 | End: 2020-10-30 | Stop reason: HOSPADM

## 2020-10-30 RX ORDER — HYDROMORPHONE HYDROCHLORIDE 1 MG/ML
0.25 INJECTION, SOLUTION INTRAMUSCULAR; INTRAVENOUS; SUBCUTANEOUS EVERY 5 MIN PRN
Status: DISCONTINUED | OUTPATIENT
Start: 2020-10-30 | End: 2020-10-30 | Stop reason: HOSPADM

## 2020-10-30 RX ORDER — MIDAZOLAM HYDROCHLORIDE 1 MG/ML
INJECTION INTRAMUSCULAR; INTRAVENOUS PRN
Status: DISCONTINUED | OUTPATIENT
Start: 2020-10-30 | End: 2020-10-30 | Stop reason: SDUPTHER

## 2020-10-30 RX ORDER — HYDROCODONE BITARTRATE AND ACETAMINOPHEN 5; 325 MG/1; MG/1
1 TABLET ORAL EVERY 6 HOURS PRN
Qty: 10 TABLET | Refills: 0 | Status: SHIPPED | OUTPATIENT
Start: 2020-10-30 | End: 2020-11-02

## 2020-10-30 RX ORDER — FENTANYL CITRATE 50 UG/ML
INJECTION, SOLUTION INTRAMUSCULAR; INTRAVENOUS PRN
Status: DISCONTINUED | OUTPATIENT
Start: 2020-10-30 | End: 2020-10-30 | Stop reason: SDUPTHER

## 2020-10-30 RX ORDER — KETOROLAC TROMETHAMINE 30 MG/ML
INJECTION, SOLUTION INTRAMUSCULAR; INTRAVENOUS PRN
Status: DISCONTINUED | OUTPATIENT
Start: 2020-10-30 | End: 2020-10-30 | Stop reason: SDUPTHER

## 2020-10-30 RX ORDER — DEXAMETHASONE SODIUM PHOSPHATE 10 MG/ML
INJECTION, SOLUTION INTRAMUSCULAR; INTRAVENOUS PRN
Status: DISCONTINUED | OUTPATIENT
Start: 2020-10-30 | End: 2020-10-30 | Stop reason: SDUPTHER

## 2020-10-30 RX ORDER — FENTANYL CITRATE 50 UG/ML
50 INJECTION, SOLUTION INTRAMUSCULAR; INTRAVENOUS
Status: DISCONTINUED | OUTPATIENT
Start: 2020-10-30 | End: 2020-10-30 | Stop reason: HOSPADM

## 2020-10-30 RX ORDER — ONDANSETRON 2 MG/ML
INJECTION INTRAMUSCULAR; INTRAVENOUS PRN
Status: DISCONTINUED | OUTPATIENT
Start: 2020-10-30 | End: 2020-10-30 | Stop reason: SDUPTHER

## 2020-10-30 RX ADMIN — FENTANYL CITRATE 50 MCG: 50 INJECTION INTRAMUSCULAR; INTRAVENOUS at 09:10

## 2020-10-30 RX ADMIN — CEFAZOLIN SODIUM 1 G: 1 INJECTION, POWDER, FOR SOLUTION INTRAMUSCULAR; INTRAVENOUS at 09:11

## 2020-10-30 RX ADMIN — LIDOCAINE HYDROCHLORIDE 50 MG: 10 INJECTION, SOLUTION EPIDURAL; INFILTRATION; INTRACAUDAL; PERINEURAL at 09:06

## 2020-10-30 RX ADMIN — DEXAMETHASONE SODIUM PHOSPHATE 10 MG: 10 INJECTION, SOLUTION INTRAMUSCULAR; INTRAVENOUS at 09:11

## 2020-10-30 RX ADMIN — KETOROLAC TROMETHAMINE 30 MG: 30 INJECTION, SOLUTION INTRAMUSCULAR; INTRAVENOUS at 09:26

## 2020-10-30 RX ADMIN — MIDAZOLAM 2 MG: 1 INJECTION INTRAMUSCULAR; INTRAVENOUS at 09:00

## 2020-10-30 RX ADMIN — ONDANSETRON HYDROCHLORIDE 4 MG: 2 INJECTION, SOLUTION INTRAMUSCULAR; INTRAVENOUS at 09:18

## 2020-10-30 RX ADMIN — SODIUM CHLORIDE, SODIUM LACTATE, POTASSIUM CHLORIDE, AND CALCIUM CHLORIDE: 600; 310; 30; 20 INJECTION, SOLUTION INTRAVENOUS at 09:00

## 2020-10-30 RX ADMIN — PROPOFOL 200 MG: 10 INJECTION, EMULSION INTRAVENOUS at 09:06

## 2020-10-30 RX ADMIN — FENTANYL CITRATE 50 MCG: 50 INJECTION INTRAMUSCULAR; INTRAVENOUS at 09:18

## 2020-10-30 ASSESSMENT — PAIN DESCRIPTION - PAIN TYPE: TYPE: SURGICAL PAIN

## 2020-10-30 ASSESSMENT — PAIN DESCRIPTION - LOCATION: LOCATION: BACK

## 2020-10-30 ASSESSMENT — LIFESTYLE VARIABLES: SMOKING_STATUS: 0

## 2020-10-30 ASSESSMENT — PAIN DESCRIPTION - DESCRIPTORS: DESCRIPTORS: ACHING

## 2020-10-30 ASSESSMENT — ENCOUNTER SYMPTOMS: SHORTNESS OF BREATH: 0

## 2020-10-30 ASSESSMENT — PAIN SCALES - GENERAL
PAINLEVEL_OUTOF10: 2
PAINLEVEL_OUTOF10: 0

## 2020-10-30 NOTE — ANESTHESIA PRE PROCEDURE
kg)   10/27/20 260 lb (117.9 kg)   10/21/20 260 lb (117.9 kg)     Body mass index is 49.13 kg/m². CBC:   Lab Results   Component Value Date    WBC 8.6 10/27/2020    RBC 5.37 10/27/2020    HGB 15.8 10/27/2020    HCT 47.0 10/27/2020    MCV 87.5 10/27/2020    RDW 12.3 10/27/2020     10/27/2020       CMP:   Lab Results   Component Value Date     12/02/2018    K 3.7 12/02/2018     12/02/2018    CO2 24 12/02/2018    BUN 18 12/02/2018    CREATININE 1.0 12/02/2018    LABGLOM >60 12/02/2018    GLUCOSE 127 12/02/2018    PROT 6.9 12/02/2018    CALCIUM 9.2 12/02/2018    BILITOT <0.2 12/02/2018    ALKPHOS 95 12/02/2018    AST 13 12/02/2018    ALT 21 12/02/2018       POC Tests: No results for input(s): POCGLU, POCNA, POCK, POCCL, POCBUN, POCHEMO, POCHCT in the last 72 hours. Coags: No results found for: PROTIME, INR, APTT    HCG (If Applicable): No results found for: PREGTESTUR, PREGSERUM, HCG, HCGQUANT     ABGs: No results found for: PHART, PO2ART, JCX1PHD, MSO5GBN, BEART, T6CHXJVW     Type & Screen (If Applicable):  No results found for: LABABO, LABRH    Drug/Infectious Status (If Applicable):  No results found for: HIV, HEPCAB    COVID-19 Screening (If Applicable):   Lab Results   Component Value Date    COVID19 NOT DETECTED 10/27/2020         Anesthesia Evaluation  Patient summary reviewed and Nursing notes reviewed no history of anesthetic complications:   Airway: Mallampati: II  TM distance: >3 FB   Neck ROM: full  Mouth opening: > = 3 FB Dental: normal exam         Pulmonary:Negative Pulmonary ROS and normal exam  breath sounds clear to auscultation      (-) shortness of breath and not a current smoker          Patient did not smoke on day of surgery.                  Cardiovascular:        (-) hypertension, CAD,  angina and  CHF    NYHA Classification: I  ECG reviewed  Rhythm: regular  Rate: normal           Beta Blocker:  Not on Beta Blocker         Neuro/Psych:   Negative Neuro/Psych ROS     (-) seizures, CVA and depression/anxiety            GI/Hepatic/Renal: Neg GI/Hepatic/Renal ROS  (+) morbid obesity     (-) hiatal hernia and GERD       Endo/Other: Negative Endo/Other ROS             Pt had PAT visit. Abdominal:   (+) obese,     Abdomen: soft. Vascular:                                        Anesthesia Plan      general     ASA 2     (Iv zofran within 30 min of closing )  Induction: intravenous. BIS  MIPS: Postoperative opioids intended and Prophylactic antiemetics administered. Anesthetic plan and risks discussed with patient. Use of blood products discussed with patient whom. Plan discussed with CRNA.     Attending anesthesiologist reviewed and agrees with Pre Eval content              Fay Comer MD   10/30/2020

## 2020-10-30 NOTE — ANESTHESIA POSTPROCEDURE EVALUATION
Department of Anesthesiology  Postprocedure Note    Patient: Alan Alvarez  MRN: [id number]  YOB: 1975  Date of evaluation: 10/30/2020  Time:  9:35 AM     Procedure Summary     Date:  10/30/20 Room / Location:  [address]    Anesthesia Start:  0900 Anesthesia Stop:  1583    Procedure:  DILATATION AND CURETTAGE HYSTEROSCOPY ENDOMETRIAL ABLATION (N/A Vagina ) Diagnosis:  (MENORRHAGIA)    Surgeon:  Jake Rodrigez MD Responsible Provider:  NEHEMIAH Ferguson CRNA    Anesthesia Type:  general ASA Status:  2          Anesthesia Type: general    Monty Phase I: Monty Score: 10    Monty Phase II:      Last vitals: Reviewed and per EMR flowsheets.        Anesthesia Post Evaluation    Patient location during evaluation: PACU  Patient participation: complete - patient participated  Level of consciousness: sleepy but conscious  Pain score: 0  Airway patency: patent  Nausea & Vomiting: no nausea and no vomiting  Complications: no  Cardiovascular status: hemodynamically stable and blood pressure returned to baseline  Respiratory status: acceptable  Hydration status: stable

## 2020-10-30 NOTE — H&P
Patient ID: Erin Santana is a 41 y.o. female.     HPI     Patient is here for a D&C, hysteroscopy, and Novasure ablation on 10/30/2020. She had IUD removed about 2 weeks ago due to heavy vaginal bleeding. The bleeding has persisted and pt desires surgical intervention. Recent TVUS showed   The transvaginal sonography of the pelvis is performed. There is no    previous similar study available for comparison. The uterus measures 8.1 x 5.9 x 6.9 cm. An intrauterine contraceptive    device is seen in place. The endometrium could not be evaluated    optimally due to the presence of the IUD. There are 2 relatively hypoechoic solid nodules in the uterus. There    is a nodule measuring 1.8 x 1.9 x 1.8 cm in the posterior wall. There    is a nodule measuring 1.9 x 1.6 x 1.7 cm in the fundus of the uterus. The right ovary measures 2.7 x 1.6 x 1.5 cm. There is normal blood    flow. There are small hypoechoic nodules representing follicular    cysts. No dominant follicle. There are small hyperechoic nodules/areas    measuring 5 to 6 mm in diameter in the right ovary which probably    represent hemorrhagic/complicated or partially collapsed cyst.    The left ovary measures 3.6 x 3 x 2.5 cm. There is normal blood flow. There is a dominant cyst in the left ovary measuring 2.6 x 1.9 x 2 cm.            Erin Santana is a 41 y.o. female with the following history as recorded in [facility]:       Patient Active Problem List     Diagnosis Date Noted    IUD (intrauterine device) in place 10/05/2020     Current Facility-Administered Medications   No current outpatient medications on file.      No current facility-administered medications for this visit. Allergies: Patient has no known allergies.   Past Medical History        Past Medical History:   Diagnosis Date    IUD (intrauterine device) in place 10/31/2019    Obesity affecting pregnancy in first trimester 5/16/2019    Ovarian cyst          Past Surgical History Past Surgical History:   Procedure Laterality Date    DENTAL SURGERY        DILATION AND CURETTAGE        DILATION AND CURETTAGE OF UTERUS        HYSTEROSCOPY        OVARIAN CYST SURGERY            Family History         Family History   Problem Relation Age of Onset    Colon Cancer Mother 54        Social History           Tobacco Use    Smoking status: Never Smoker    Smokeless tobacco: Never Used   Substance Use Topics    Alcohol use: No        Review of Systems   Constitutional: Negative. HENT: Negative. Eyes: Negative. Respiratory: Negative. Cardiovascular: Negative. Gastrointestinal: Negative. Genitourinary: Negative for difficulty urinating, dyspareunia, dysuria, enuresis, frequency, hematuria, menstrual problem, pelvic pain, urgency and vaginal discharge. Musculoskeletal: Negative. Skin: Negative. Neurological: Negative. Psychiatric/Behavioral: Negative.          Objective:BP (!) 165/98   Pulse 77   Temp 97.1 °F (36.2 °C) (Temporal)   Resp 16   Ht 5' 1\" (1.549 m)   Wt 260 lb (117.9 kg)   LMP 10/19/2020   SpO2 100%   BMI 49.13 kg/m²      Physical Exam  Constitutional:       Appearance: She is well-developed. HENT:      Head: Normocephalic and atraumatic. Right Ear: Hearing normal.      Left Ear: Hearing normal.      Nose: Nose normal.   Eyes:      General: Lids are normal.      Conjunctiva/sclera: Conjunctivae normal.      Pupils: Pupils are equal, round, and reactive to light. Neck:      Musculoskeletal: Normal range of motion and neck supple. Cardiovascular:      Rate and Rhythm: Normal rate and regular rhythm. Pulmonary:      Effort: Pulmonary effort is normal.      Breath sounds: Normal breath sounds. Abdominal:      General: There is no distension. Palpations: Abdomen is soft. Tenderness: There is no abdominal tenderness. Musculoskeletal: Normal range of motion.       Comments: Normal ROM in all four extremities; normal gait Skin:     General: Skin is warm and dry. Neurological:      Mental Status: She is alert and oriented to person, place, and time. Psychiatric:         Behavior: Behavior normal.            Assessment:       Diagnosis Orders   1. Menorrhagia with regular cycle                         Plan:      Counseling was offered and accepted by patient. Discussed at length the risks, benefits and alternatives to surgery including medical management and no intervention. Some patients will need a full medical clearance. Preop testing ordered and we will review scheduling to determine date and time. Consents for surgery signed and all questions proceeding. All questions answered and patient voiced understanding of above.

## 2020-10-30 NOTE — OP NOTE
10/30/2020  3:89 AM *    Complications: None    Specimens:   ID Type Source Tests Collected by Time Destination   A : Endometrial curettings Tissue Uterus SURGICAL PATHOLOGY Mary Lou Mustafa MD 10/30/2020 Fermin Nowak MD  10/30/20  9:32 AM

## 2021-01-08 ENCOUNTER — OFFICE VISIT (OUTPATIENT)
Dept: INTERNAL MEDICINE | Age: 46
End: 2021-01-08
Payer: COMMERCIAL

## 2021-01-08 VITALS
SYSTOLIC BLOOD PRESSURE: 124 MMHG | HEART RATE: 58 BPM | HEIGHT: 61 IN | BODY MASS INDEX: 48.52 KG/M2 | WEIGHT: 257 LBS | DIASTOLIC BLOOD PRESSURE: 80 MMHG | OXYGEN SATURATION: 98 %

## 2021-01-08 DIAGNOSIS — E11.9 TYPE 2 DIABETES MELLITUS WITHOUT COMPLICATION, WITHOUT LONG-TERM CURRENT USE OF INSULIN (HCC): ICD-10-CM

## 2021-01-08 DIAGNOSIS — E78.2 MIXED HYPERLIPIDEMIA: ICD-10-CM

## 2021-01-08 DIAGNOSIS — M79.672 PAIN OF LEFT HEEL: ICD-10-CM

## 2021-01-08 DIAGNOSIS — E66.01 CLASS 3 SEVERE OBESITY DUE TO EXCESS CALORIES WITHOUT SERIOUS COMORBIDITY WITH BODY MASS INDEX (BMI) OF 45.0 TO 49.9 IN ADULT (HCC): ICD-10-CM

## 2021-01-08 DIAGNOSIS — M54.2 NECK PAIN: ICD-10-CM

## 2021-01-08 DIAGNOSIS — G47.33 OBSTRUCTIVE SLEEP APNEA SYNDROME: ICD-10-CM

## 2021-01-08 DIAGNOSIS — E78.2 MIXED HYPERLIPIDEMIA: Primary | ICD-10-CM

## 2021-01-08 DIAGNOSIS — Z00.00 ROUTINE GENERAL MEDICAL EXAMINATION AT A HEALTH CARE FACILITY: Primary | ICD-10-CM

## 2021-01-08 DIAGNOSIS — Z13.220 SCREENING FOR HYPERLIPIDEMIA: ICD-10-CM

## 2021-01-08 DIAGNOSIS — F41.9 ANXIETY: ICD-10-CM

## 2021-01-08 DIAGNOSIS — Z23 NEED FOR VACCINATION: ICD-10-CM

## 2021-01-08 LAB
ALBUMIN SERPL-MCNC: 4 G/DL (ref 3.5–5.2)
ALP BLD-CCNC: 122 U/L (ref 35–104)
ALT SERPL-CCNC: 16 U/L (ref 5–33)
ANION GAP SERPL CALCULATED.3IONS-SCNC: 10 MMOL/L (ref 7–19)
AST SERPL-CCNC: 11 U/L (ref 5–32)
BASOPHILS ABSOLUTE: 0 K/UL (ref 0–0.2)
BASOPHILS RELATIVE PERCENT: 0.3 % (ref 0–1)
BILIRUB SERPL-MCNC: 0.3 MG/DL (ref 0.2–1.2)
BUN BLDV-MCNC: 20 MG/DL (ref 6–20)
CALCIUM SERPL-MCNC: 9.4 MG/DL (ref 8.6–10)
CHLORIDE BLD-SCNC: 103 MMOL/L (ref 98–111)
CHOLESTEROL, TOTAL: 251 MG/DL (ref 160–199)
CO2: 26 MMOL/L (ref 22–29)
CREAT SERPL-MCNC: 0.8 MG/DL (ref 0.5–0.9)
CREATININE URINE: 260.6 MG/DL (ref 4.2–622)
EOSINOPHILS ABSOLUTE: 0.1 K/UL (ref 0–0.6)
EOSINOPHILS RELATIVE PERCENT: 0.7 % (ref 0–5)
GFR AFRICAN AMERICAN: >59
GFR NON-AFRICAN AMERICAN: >60
GLUCOSE BLD-MCNC: 181 MG/DL (ref 74–109)
HBA1C MFR BLD: 11.6 % (ref 4–6)
HCT VFR BLD CALC: 43.9 % (ref 37–47)
HDLC SERPL-MCNC: 45 MG/DL (ref 65–121)
HEMOGLOBIN: 14.7 G/DL (ref 12–16)
IMMATURE GRANULOCYTES #: 0 K/UL
LDL CHOLESTEROL CALCULATED: 164 MG/DL
LYMPHOCYTES ABSOLUTE: 2.9 K/UL (ref 1.1–4.5)
LYMPHOCYTES RELATIVE PERCENT: 30 % (ref 20–40)
MCH RBC QN AUTO: 29.3 PG (ref 27–31)
MCHC RBC AUTO-ENTMCNC: 33.5 G/DL (ref 33–37)
MCV RBC AUTO: 87.6 FL (ref 81–99)
MICROALBUMIN UR-MCNC: 1.5 MG/DL (ref 0–19)
MICROALBUMIN/CREAT UR-RTO: 5.8 MG/G
MONOCYTES ABSOLUTE: 0.4 K/UL (ref 0–0.9)
MONOCYTES RELATIVE PERCENT: 4 % (ref 0–10)
NEUTROPHILS ABSOLUTE: 6.3 K/UL (ref 1.5–7.5)
NEUTROPHILS RELATIVE PERCENT: 64.7 % (ref 50–65)
PDW BLD-RTO: 12.6 % (ref 11.5–14.5)
PLATELET # BLD: 214 K/UL (ref 130–400)
PMV BLD AUTO: 10.5 FL (ref 9.4–12.3)
POTASSIUM SERPL-SCNC: 4.5 MMOL/L (ref 3.5–5)
RBC # BLD: 5.01 M/UL (ref 4.2–5.4)
SODIUM BLD-SCNC: 139 MMOL/L (ref 136–145)
TOTAL PROTEIN: 6.8 G/DL (ref 6.6–8.7)
TRIGL SERPL-MCNC: 208 MG/DL (ref 0–149)
TSH SERPL DL<=0.05 MIU/L-ACNC: 1.38 UIU/ML (ref 0.27–4.2)
WBC # BLD: 9.7 K/UL (ref 4.8–10.8)

## 2021-01-08 PROCEDURE — 90686 IIV4 VACC NO PRSV 0.5 ML IM: CPT | Performed by: PHYSICIAN ASSISTANT

## 2021-01-08 PROCEDURE — 90471 IMMUNIZATION ADMIN: CPT | Performed by: PHYSICIAN ASSISTANT

## 2021-01-08 PROCEDURE — 99204 OFFICE O/P NEW MOD 45 MIN: CPT | Performed by: PHYSICIAN ASSISTANT

## 2021-01-08 RX ORDER — GLUCOSAMINE HCL/CHONDROITIN SU 500-400 MG
CAPSULE ORAL
Qty: 100 STRIP | Refills: 3 | Status: SHIPPED | OUTPATIENT
Start: 2021-01-08

## 2021-01-08 RX ORDER — ATORVASTATIN CALCIUM 20 MG/1
20 TABLET, FILM COATED ORAL DAILY
Qty: 90 TABLET | Refills: 3 | Status: SHIPPED | OUTPATIENT
Start: 2021-01-08 | End: 2021-02-04 | Stop reason: SDUPTHER

## 2021-01-08 RX ORDER — TIZANIDINE 4 MG/1
4 TABLET ORAL EVERY 8 HOURS PRN
Qty: 40 TABLET | Refills: 2 | Status: SHIPPED | OUTPATIENT
Start: 2021-01-08

## 2021-01-08 RX ORDER — ESCITALOPRAM OXALATE 10 MG/1
10 TABLET ORAL DAILY
Qty: 30 TABLET | Refills: 1 | Status: SHIPPED | OUTPATIENT
Start: 2021-01-08 | End: 2021-02-05 | Stop reason: SDUPTHER

## 2021-01-08 ASSESSMENT — PATIENT HEALTH QUESTIONNAIRE - PHQ9
SUM OF ALL RESPONSES TO PHQ QUESTIONS 1-9: 0
SUM OF ALL RESPONSES TO PHQ QUESTIONS 1-9: 0
SUM OF ALL RESPONSES TO PHQ9 QUESTIONS 1 & 2: 0
1. LITTLE INTEREST OR PLEASURE IN DOING THINGS: 0
SUM OF ALL RESPONSES TO PHQ QUESTIONS 1-9: 0

## 2021-01-08 ASSESSMENT — ENCOUNTER SYMPTOMS
VOMITING: 0
SINUS PRESSURE: 0
EYE REDNESS: 0
WHEEZING: 0
SORE THROAT: 0
RHINORRHEA: 0
PHOTOPHOBIA: 0
CONSTIPATION: 0
CHEST TIGHTNESS: 0
COLOR CHANGE: 0
NAUSEA: 0
ABDOMINAL PAIN: 0
COUGH: 0
EYE PAIN: 0
SHORTNESS OF BREATH: 0
DIARRHEA: 0

## 2021-01-08 NOTE — PROGRESS NOTES
[address] INTERNAL MEDICINE  [address] [place] [address]  [address] [address]  Dept: [phone]  Dept Fax: [phone]: [phone]      [facility] INTERNAL MEDICINE  OFFICE NOTE      Chief Complaint   Patient presents with   Arcelia Shi Establish Care     patient thinks she has sleep apnea, thinks she may be a diabetic , left neck and shoulder and hands go numb sometimes       Jose Angel Yancey is a 41y.o. year old female who is seen for to establish care with a new healthcare provider. Patient states she really has not had a primary care provider in the last 20 years. Patient has not taken any medications right now. Patient did have a hemoglobin A1c ran last year and it was 7.4. We rechecked it today and it was 11.2. Patient has noticed that she is always thirsty and always hungry and is urinating a lot more. So patient is diabetic start patient on some Metformin. Patient states has lost about 30 pounds over the last year. Patient also needs to get in for diabetic eye exam.    Patient states has been having some chronic left neck pain with some radiculopathy down into the hands. Patient states will take some ibuprofen from time to time and it does seem to help sometimes. We will send patient to physical therapy and will start patient on muscle relaxant and patient can continue the ibuprofen as needed. Patient also feels like she is got quite a bit of anxiety and she would like to try some medication. Patient denies any specific depression. Patient denies any SI or HI. Patient also having some problems with her feet. Patient states usually gets worse later on in the day when she is up on her feet a lot. 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 Patient does see Mirna Jones for her GYN needs. Patient did have a mammogram in October 2020. We will repeat it in 1 year.     Patient is additional to accommodate PRN testing needs. Yes JUANITA Mckeon       Past Surgical History:   Procedure Laterality Date    DENTAL SURGERY      DILATION AND CURETTAGE      DILATION AND CURETTAGE OF UTERUS      DILATION AND CURETTAGE OF UTERUS N/A 10/30/2020    DILATATION AND CURETTAGE HYSTEROSCOPY ENDOMETRIAL ABLATION performed by Dinh Lin MD at [facility] HYSTEROSCOPY      OVARIAN CYST SURGERY         Family History   Problem Relation Age of Onset    Colon Cancer Mother 54       No Known Allergies    Social History     Socioeconomic History    Marital status:      Spouse name: Not on file    Number of children: Not on file    Years of education: Not on file    Highest education level: Not on file   Occupational History    Not on file   Social Needs    Financial resource strain: Not on file    Food insecurity     Worry: Not on file     Inability: Not on file    Transportation needs     Medical: Not on file     Non-medical: Not on file   Tobacco Use    Smoking status: Never Smoker    Smokeless tobacco: Never Used   Substance and Sexual Activity    Alcohol use: Yes     Comment: OCC    Drug use: No    Sexual activity: Yes     Partners: Male     Birth control/protection: I.U.D.    Lifestyle    Physical activity     Days per week: Not on file     Minutes per session: Not on file    Stress: Not on file   Relationships    Social connections     Talks on phone: Not on file     Gets together: Not on file     Attends [employer] service: Not on file     Active member of club or organization: Not on file     Attends meetings of clubs or organizations: Not on file     Relationship status: Not on file    Intimate partner violence     Fear of current or ex partner: Not on file     Emotionally abused: Not on file     Physically abused: Not on file     Forced sexual activity: Not on file   Other Topics Concern    Not on file   Social History Narrative    Not on file       Review of Systems  Review of Systems   Constitutional: Negative for activity change, appetite change, chills, fatigue and fever. HENT: Negative for congestion, ear pain, postnasal drip, rhinorrhea, sinus pressure, sneezing and sore throat. Eyes: Negative for photophobia, pain and redness. Respiratory: Negative for cough, chest tightness, shortness of breath and wheezing. Cardiovascular: Negative for chest pain, palpitations and leg swelling. Gastrointestinal: Negative for abdominal pain, constipation, diarrhea, nausea and vomiting. Endocrine: Positive for polydipsia, polyphagia and polyuria. Genitourinary: Negative for dysuria, frequency, hematuria and urgency. Musculoskeletal: Positive for arthralgias, neck pain and neck stiffness. Negative for gait problem, joint swelling and myalgias. Skin: Negative for color change, pallor and wound. Neurological: Negative for dizziness, facial asymmetry, speech difficulty, weakness and light-headedness. Hematological: Negative for adenopathy. Does not bruise/bleed easily. Psychiatric/Behavioral: Negative for confusion. The patient is nervous/anxious. Current Outpatient Medications   Medication Sig Dispense Refill    metFORMIN (GLUCOPHAGE) 500 MG tablet Take 1 tablet by mouth 2 times daily (with meals) 180 tablet 1    tiZANidine (ZANAFLEX) 4 MG tablet Take 1 tablet by mouth every 8 hours as needed (neck spasm.) 40 tablet 2    escitalopram (LEXAPRO) 10 MG tablet Take 1 tablet by mouth daily 30 tablet 1    blood glucose monitor kit and supplies Dispense sufficient amount for indicated testing frequency plus additional to accommodate PRN testing needs. Dispense all needed supplies to include: monitor, strips, lancing device, lancets, control solutions, alcohol swabs. 1 kit 0    blood glucose monitor strips Test 1 times a day & as needed for symptoms of irregular blood glucose.  Dispense sufficient amount for indicated testing frequency plus additional to accommodate PRN testing needs. 100 strip 3     No current facility-administered medications for this visit. /80   Pulse 58   Ht 5' 1\" (1.549 m)   Wt 257 lb (116.6 kg)   SpO2 98%   BMI 48.56 kg/m²     PHYSICAL EXAM  Physical Exam  Vitals signs and nursing note reviewed. Constitutional:       Appearance: She is well-developed. HENT:      Head: Normocephalic and atraumatic. Right Ear: External ear normal.      Left Ear: External ear normal.      Nose: Nose normal.   Eyes:      General:         Right eye: No discharge. Left eye: No discharge. Pupils: Pupils are equal, round, and reactive to light. Neck:      Musculoskeletal: Normal range of motion. Trachea: No tracheal deviation. Cardiovascular:      Rate and Rhythm: Normal rate and regular rhythm. Heart sounds: Normal heart sounds. No murmur. No friction rub. No gallop. Pulmonary:      Effort: Pulmonary effort is normal. No respiratory distress. Breath sounds: Normal breath sounds. No wheezing or rales. Chest:      Chest wall: No tenderness. Abdominal:      Palpations: Abdomen is soft. Tenderness: There is no abdominal tenderness. There is no guarding or rebound. Musculoskeletal: Normal range of motion. General: No deformity. Cervical back: She exhibits tenderness, pain and spasm. She exhibits normal range of motion. Back:         Feet:    Lymphadenopathy:      Cervical: No cervical adenopathy. Skin:     General: Skin is warm and dry. Findings: No erythema, lesion or rash. Neurological:      Mental Status: She is alert. Psychiatric:         Mood and Affect: Mood normal. Mood is not anxious or depressed.          Monofilament Exam Reveals:  Pulses: normal  Edema:normal  Skin Lesions:normal  Right Foot:    Left Foot:  Normal sensation at 1-10   Normal sensation at 1-10   Diminished sensation at 0   Diminished sensation at 0   No sensation at 0    No sensation at 0 ASSESSMENT      ICD-10-CM    1. Routine general medical examination at a health care facility  Z00.00    2. Need for vaccination  Z23 INFLUENZA, QUADV, 3 YRS AND OLDER, IM PF, PREFILL SYR OR SDV, 0.5ML (AFLURIA QUADV, PF)   3. Pain of left heel  M79.672 External Referral To Podiatry   4. Type 2 diabetes mellitus without complication, without long-term current use of insulin (HCC)  E11.9 Hemoglobin A1C     Microalbumin / Creatinine Urine Ratio     metFORMIN (GLUCOPHAGE) 500 MG tablet      DIABETES FOOT EXAM     blood glucose monitor kit and supplies     blood glucose monitor strips   5. Neck pain  M54.2 [facility]     tiZANidine (ZANAFLEX) 4 MG tablet   6. Mixed hyperlipidemia  E78.2 CBC Auto Differential     Comprehensive Metabolic Panel     Lipid Panel   7. Class 3 severe obesity due to excess calories without serious comorbidity with body mass index (BMI) of 45.0 to 49.9 in adult ([facility])  E66.01 TSH without Reflex    Z68.42    8. Anxiety  F41.9 escitalopram (LEXAPRO) 10 MG tablet   9. Obstructive sleep apnea syndrome  G47.33 [facility]   10. Screening for hyperlipidemia  Z13.220        PLAN  Patient did get her flu shot today. We will go ahead and send patient to Dr. Jeri Willett forward with the heel pain she is thinking about possibly getting some steroid injections and her heel. Patient is a type II diabetic we will start patient on metformin 500 mg twice a day and I put in the test strips and monitor for the patient to monitor blood sugars I told her to keep a log of the fasting glucoses. Discussed with patient proper diabetic diet cutting out problem carbohydrates. Discussed going to diet sodas if she has to drink sodas. [facility] Patient is going to try to lose some weight. Metformin may help her with that. I did start patient on some Lexapro for anxiety. I will send patient for sleep study due to sleep apnea.   Regarding send patient to physical therapy for the neck pain. Patient is going get her labs done we will better be able to assess how patient cholesterol is doing. Patient is going to follow back up with me in approximately a month see how the anxiety and how diabetes are doing at that time we may go ahead and increase the Metformin. Orders Placed This Encounter   Procedures    INFLUENZA, QUADV, 3 YRS AND OLDER, IM PF, PREFILL SYR OR SDV, 0.5ML (AFLURIA QUADV, PF)    CBC Auto Differential    Comprehensive Metabolic Panel    Hemoglobin A1C    Lipid Panel    TSH without Reflex    Microalbumin / Creatinine Urine Ratio    [facility]    External Referral To Podiatry    C/Salbador Grayson        Return in about 4 weeks (around 2/5/2021), or if symptoms worsen or fail to improve, for Follow up for anxiety and diabetes. .         All questions answered. Patient voices understanding and agrees to plans along with risks and benefits of plan. Patient is instructed to continue prior medications, diet, and exercise plans as instructed. Patient agrees to follow up as instructions, sooner if needed, or to go to ER if condition becomes emergent. Additional Instructions: As always, patient is advised to bring in medication bottles in order to correctly reconcile with our current list.      Royal Cassidy PA-C    EMR Dragon/transcription disclaimer: Much of this encounter note is electronic transcription/translation of spoken language to printed text. The electronictranslation of spoken language may be erroneous, or at times, nonsensical words or phrases may be inadvertently transcribed.  Although I have reviewed the note for such errors, some may still exist.

## 2021-01-22 ENCOUNTER — HOSPITAL ENCOUNTER (OUTPATIENT)
Dept: SLEEP CENTER | Age: 46
Discharge: HOME | End: 2021-01-24
Payer: COMMERCIAL

## 2021-01-22 PROCEDURE — 95806 SLEEP STUDY UNATT&RESP EFFT: CPT | Performed by: PSYCHIATRY & NEUROLOGY

## 2021-01-22 PROCEDURE — G0399 HOME SLEEP TEST/TYPE 3 PORTA: HCPCS

## 2021-01-22 NOTE — PROGRESS NOTES
Pt in the office to  HST monitor.  Pt was educated on how to use equipment properly and instructed to wear for 2 nights and to return on Monday.  Pt states she understood.

## 2021-01-23 PROCEDURE — 95806 SLEEP STUDY UNATT&RESP EFFT: CPT | Performed by: PSYCHIATRY & NEUROLOGY

## 2021-01-23 PROCEDURE — G0399 HOME SLEEP TEST/TYPE 3 PORTA: HCPCS

## 2021-02-04 PROBLEM — E66.01 CLASS 3 SEVERE OBESITY DUE TO EXCESS CALORIES WITH SERIOUS COMORBIDITY AND BODY MASS INDEX (BMI) OF 45.0 TO 49.9 IN ADULT (HCC): Status: ACTIVE | Noted: 2021-02-04

## 2021-02-04 PROBLEM — E66.813 CLASS 3 SEVERE OBESITY DUE TO EXCESS CALORIES WITH SERIOUS COMORBIDITY AND BODY MASS INDEX (BMI) OF 45.0 TO 49.9 IN ADULT: Status: ACTIVE | Noted: 2021-02-04

## 2021-02-04 PROBLEM — E11.49 TYPE 2 DIABETES MELLITUS WITH NEUROLOGIC COMPLICATION, WITH LONG-TERM CURRENT USE OF INSULIN (HCC): Status: ACTIVE | Noted: 2021-02-04

## 2021-02-04 PROBLEM — F41.9 ANXIETY: Status: ACTIVE | Noted: 2021-02-04

## 2021-02-04 PROBLEM — Z79.4 TYPE 2 DIABETES MELLITUS WITH NEUROLOGIC COMPLICATION, WITH LONG-TERM CURRENT USE OF INSULIN (HCC): Status: ACTIVE | Noted: 2021-02-04

## 2021-02-04 PROBLEM — E78.2 MIXED HYPERLIPIDEMIA: Status: ACTIVE | Noted: 2021-02-04

## 2021-02-04 NOTE — PROGRESS NOTES
Julianna Amos ( 1975) is a 41 y.o. female, Established , here for evaluation of the following chief complaint(s).    COVERNG FOR HYDEN  Diabetes (1 month follow up) and Anxiety (pt states that the lexapro is not working)        ASSESSMENT/PLAN:  Problem List        Endocrine    Type 2 diabetes mellitus with neurologic complication, with long-term current use of insulin (HCC)     Very uncontrolled  Will refer to Diabetic educator  Must perform SMBG 4X/day           Relevant Medications    metFORMIN (GLUCOPHAGE) 1000 MG tablet    Semaglutide,0.25 or 0.5MG/DOS, 2 MG/1.5ML SOPN    Semaglutide,0.25 or 0.5MG/DOS, 2 MG/1.5ML SOPN (Start on 3/5/2021)    SITagliptin (JANUVIA) 50 MG tablet    Other Relevant Orders    Amb External Referral To Diabetic Education    Hemoglobin A1C    Lipid Panel    Microalbumin / Creatinine Urine Ratio    MyChart Glucose Flowsheet    Basic Metabolic Panel       Other    Anxiety     On EScitalopram         Relevant Medications    escitalopram (LEXAPRO) 20 MG tablet    Class 3 severe obesity due to excess calories with serious comorbidity and body mass index (BMI) of 45.0 to 49.9 in [facility])    Relevant Medications    metFORMIN (GLUCOPHAGE) 1000 MG tablet    Semaglutide,0.25 or 0.5MG/DOS, 2 MG/1.5ML SOPN    Semaglutide,0.25 or 0.5MG/DOS, 2 MG/1.5ML SOPN (Start on 3/5/2021)    SITagliptin (JANUVIA) 50 MG tablet    Mixed hyperlipidemia     Increase Lipitor 40 mg nightly         Relevant Medications    atorvastatin (LIPITOR) 40 MG tablet      Advised to enrol in [facility]    Results for orders placed or performed in visit on 21   Microalbumin / Creatinine Urine Ratio   Result Value Ref Range    Microalbumin, Random Urine 1.50 0.00 - 19.00 mg/dL    Creatinine, Ur 260.6 4.2 - 622.0 mg/dL    Microalbumin Creatinine Ratio 5.8 mg/g   TSH without Reflex   Result Value Ref Range    TSH 1.380 0.270 - 4.200 uIU/mL   Lipid Panel   Result Value Ref Range    Cholesterol, Total 251 (H) 160 - 199 mg/dL    Triglycerides 208 (H) 0 - 149 mg/dL    HDL 45 (L) 65 - 121 mg/dL    LDL Calculated 164 <100 mg/dL   Hemoglobin A1C   Result Value Ref Range    Hemoglobin A1C 11.6 (H) 4.0 - 6.0 %   Comprehensive Metabolic Panel   Result Value Ref Range    Sodium 139 136 - 145 mmol/L    Potassium 4.5 3.5 - 5.0 mmol/L    Chloride 103 98 - 111 mmol/L    CO2 26 22 - 29 mmol/L    Anion Gap 10 7 - 19 mmol/L    Glucose 181 (H) 74 - 109 mg/dL    BUN 20 6 - 20 mg/dL    CREATININE 0.8 0.5 - 0.9 mg/dL    GFR Non-African American >60 >60    GFR African American >59 >59    Calcium 9.4 8.6 - 10.0 mg/dL    Total Protein 6.8 6.6 - 8.7 g/dL    Albumin 4.0 3.5 - 5.2 g/dL    Total Bilirubin 0.3 0.2 - 1.2 mg/dL    Alkaline Phosphatase 122 (H) 35 - 104 U/L    ALT 16 5 - 33 U/L    AST 11 5 - 32 U/L   CBC Auto Differential   Result Value Ref Range    WBC 9.7 4.8 - 10.8 K/uL    RBC 5.01 4.20 - 5.40 M/uL    Hemoglobin 14.7 12.0 - 16.0 g/dL    Hematocrit 43.9 37.0 - 47.0 %    MCV 87.6 81.0 - 99.0 fL    MCH 29.3 27.0 - 31.0 pg    MCHC 33.5 33.0 - 37.0 g/dL    RDW 12.6 11.5 - 14.5 %    Platelets 668 947 - 138 K/uL    MPV 10.5 9.4 - 12.3 fL    Neutrophils % 64.7 50.0 - 65.0 %    Lymphocytes % 30.0 20.0 - 40.0 %    Monocytes % 4.0 0.0 - 10.0 %    Eosinophils % 0.7 0.0 - 5.0 %    Basophils % 0.3 0.0 - 1.0 %    Neutrophils Absolute 6.3 1.5 - 7.5 K/uL    Immature Granulocytes # 0.0 K/uL    Lymphocytes Absolute 2.9 1.1 - 4.5 K/uL    Monocytes Absolute 0.40 0.00 - 0.90 K/uL    Eosinophils Absolute 0.10 0.00 - 0.60 K/uL    Basophils Absolute 0.00 0.00 - 0.20 K/uL       Return in about 3 months (around 5/5/2021).     HPI  COBVERING FOR BRUCE  41year old female presents for FUV  DM, she states she does not know what to eat, cut down drinking soda and she has lost some weight, she is randomly checking her blood glucose, has no idea what carbohydrates are and what to do, she no visual, nreve complains  Hyperlipidimia, taking statins , no myalgia, not avoiding fried foods or red meats  Anxiety/Panic Ds, she is not in good control, still getting very anxious  She does not exercise  She does snore and had a sleep study    Review of Systems   Constitutional: Positive for unexpected weight change. Negative for activity change, chills, fatigue and fever. HENT: Negative for hearing loss, postnasal drip, rhinorrhea, sinus pain and trouble swallowing. Eyes: Negative for visual disturbance. Respiratory: Negative for cough, chest tightness, shortness of breath and wheezing. Cardiovascular: Negative for chest pain, palpitations and leg swelling. Gastrointestinal: Negative for abdominal pain, blood in stool, constipation, diarrhea and vomiting. Endocrine: Negative for cold intolerance. Genitourinary: Negative for frequency and urgency. Musculoskeletal: Negative for arthralgias, back pain and myalgias. Allergic/Immunologic: Negative for environmental allergies. Neurological: Negative for light-headedness, numbness and headaches. Physical Exam  Vitals signs and nursing note reviewed. Constitutional:       General: She is not in acute distress. Appearance: She is obese. HENT:      Head: Normocephalic. Right Ear: External ear normal.      Left Ear: External ear normal.      Nose: Nose normal.   Eyes:      General: No scleral icterus. Conjunctiva/sclera: Conjunctivae normal.      Pupils: Pupils are equal, round, and reactive to light. Neck:      Musculoskeletal: Normal range of motion and neck supple. Thyroid: No thyromegaly. Vascular: No JVD. Cardiovascular:      Rate and Rhythm: Normal rate and regular rhythm. Heart sounds: Normal heart sounds. No murmur. Pulmonary:      Effort: Pulmonary effort is normal. No respiratory distress. Breath sounds: Normal breath sounds. No wheezing or rales. Abdominal:      General: Bowel sounds are normal. There is no distension. Palpations: Abdomen is soft. Tenderness: There is no abdominal tenderness. Musculoskeletal: Normal range of motion. General: No deformity. Lymphadenopathy:      Cervical: No cervical adenopathy. Skin:     General: Skin is warm and dry. Findings: No rash. Neurological:      Mental Status: She is alert and oriented to person, place, and time. Cranial Nerves: No cranial nerve deficit. Deep Tendon Reflexes: Reflexes normal.   Psychiatric:         Behavior: Behavior normal.         Thought Content:  Thought content normal.         Judgment: Judgment normal.           (Time Documentation Optional [id number])    An electronic signaturewaas used to authenticate this note  -Joselito Peacock MD on 2/7/2021 at 6:19 AM

## 2021-02-05 ENCOUNTER — TELEPHONE (OUTPATIENT)
Dept: INTERNAL MEDICINE | Age: 46
End: 2021-02-05

## 2021-02-05 ENCOUNTER — OFFICE VISIT (OUTPATIENT)
Dept: INTERNAL MEDICINE | Age: 46
End: 2021-02-05
Payer: COMMERCIAL

## 2021-02-05 VITALS
BODY MASS INDEX: 46.48 KG/M2 | WEIGHT: 246.2 LBS | SYSTOLIC BLOOD PRESSURE: 122 MMHG | DIASTOLIC BLOOD PRESSURE: 80 MMHG | HEIGHT: 61 IN | HEART RATE: 68 BPM | OXYGEN SATURATION: 98 %

## 2021-02-05 DIAGNOSIS — F41.9 ANXIETY: ICD-10-CM

## 2021-02-05 DIAGNOSIS — Z11.59 ENCOUNTER FOR HEPATITIS C SCREENING TEST FOR LOW RISK PATIENT: ICD-10-CM

## 2021-02-05 DIAGNOSIS — Z23 NEED FOR VACCINATION AGAINST STREPTOCOCCUS PNEUMONIAE: ICD-10-CM

## 2021-02-05 DIAGNOSIS — E11.42 TYPE 2 DIABETES MELLITUS WITH DIABETIC POLYNEUROPATHY, WITH LONG-TERM CURRENT USE OF INSULIN (HCC): ICD-10-CM

## 2021-02-05 DIAGNOSIS — Z79.4 TYPE 2 DIABETES MELLITUS WITH DIABETIC POLYNEUROPATHY, WITH LONG-TERM CURRENT USE OF INSULIN (HCC): ICD-10-CM

## 2021-02-05 DIAGNOSIS — E78.2 COMBINED HYPERLIPIDEMIA: ICD-10-CM

## 2021-02-05 DIAGNOSIS — E78.2 MIXED HYPERLIPIDEMIA: ICD-10-CM

## 2021-02-05 DIAGNOSIS — E11.9 TYPE 2 DIABETES MELLITUS WITHOUT COMPLICATION, WITHOUT LONG-TERM CURRENT USE OF INSULIN (HCC): ICD-10-CM

## 2021-02-05 DIAGNOSIS — E66.01 CLASS 3 SEVERE OBESITY DUE TO EXCESS CALORIES WITH SERIOUS COMORBIDITY AND BODY MASS INDEX (BMI) OF 45.0 TO 49.9 IN ADULT (HCC): ICD-10-CM

## 2021-02-05 DIAGNOSIS — Z11.4 SCREENING FOR HIV WITHOUT PRESENCE OF RISK FACTORS: Primary | ICD-10-CM

## 2021-02-05 PROCEDURE — 99214 OFFICE O/P EST MOD 30 MIN: CPT | Performed by: INTERNAL MEDICINE

## 2021-02-05 PROCEDURE — 90732 PPSV23 VACC 2 YRS+ SUBQ/IM: CPT | Performed by: INTERNAL MEDICINE

## 2021-02-05 PROCEDURE — 90471 IMMUNIZATION ADMIN: CPT | Performed by: INTERNAL MEDICINE

## 2021-02-05 RX ORDER — ATORVASTATIN CALCIUM 40 MG/1
40 TABLET, FILM COATED ORAL DAILY
Qty: 180 TABLET | Refills: 1 | Status: SHIPPED | OUTPATIENT
Start: 2021-02-05 | End: 2021-05-06

## 2021-02-05 RX ORDER — ESCITALOPRAM OXALATE 20 MG/1
20 TABLET ORAL DAILY
Qty: 90 TABLET | Refills: 1 | Status: SHIPPED | OUTPATIENT
Start: 2021-02-05 | End: 2021-05-06

## 2021-02-05 ASSESSMENT — ENCOUNTER SYMPTOMS
SHORTNESS OF BREATH: 0
TROUBLE SWALLOWING: 0
VOMITING: 0
SINUS PAIN: 0
COUGH: 0
RHINORRHEA: 0
DIARRHEA: 0
WHEEZING: 0
ABDOMINAL PAIN: 0
CHEST TIGHTNESS: 0
CONSTIPATION: 0
BACK PAIN: 0
BLOOD IN STOOL: 0

## 2021-02-05 NOTE — TELEPHONE ENCOUNTER
[facility] called and wanted to see if you would put the [facility] script all on one script with the two separate directions she said that the insurance is not wanting to cover it and we will likely have to do a PA.

## 2021-02-05 NOTE — PATIENT INSTRUCTIONS
fat-free foods have more calories than regular ones. Eat fat-free treats in moderation, as you would other foods. If your favorite foods are high in fat, salt, sugar, or calories, limit how often you eat them. Eat smaller servings, or look for healthy substitutes. Fill up on fruits, vegetables, and whole grains. Eating at home  · Use meat as a side dish instead of as the main part of your meal.  · Try main dishes that use whole wheat pasta, brown rice, dried beans, or vegetables. · Find ways to cook with little or no fat, such as broiling, steaming, or grilling. · Use cooking spray instead of oil. If you use oil, use a monounsaturated oil, such as canola or olive oil. · Trim fat from meats before you cook them. · Drain off fat after you brown the meat or while you roast it. · Chill soups and stews after you cook them. Then skim the fat off the top after it hardens. Eating out  · Order foods that are broiled or poached rather than fried or breaded. · Cut back on the amount of butter or margarine that you use on bread. · Order sauces, gravies, and salad dressings on the side, and use only a little. · When you order pasta, choose tomato sauce rather than cream sauce. · Ask for salsa with your baked potato instead of sour cream, butter, cheese, or del angel. · Order meals in a small size instead of upgrading to a large. · Share an entree, or take part of your food home to eat as another meal.  · Share appetizers and desserts. Where can you learn more? Go to https://ngmocosheri.healthZendesk. org and sign in to your [employer] account. Enter D204 in the Offerboxx box to learn more about \"Learning About Cutting Calories. \"     If you do not have an account, please click on the \"Sign Up Now\" link. Current as of: August 22, 2019               Content Version: 12.6  © 6763-1229 [employer]. Care instructions adapted under license by [facility] ([facility]).  If you have questions about a medical condition or this instruction, always ask your healthcare professional. [place] [address] any warranty or liability for your use of this information.

## 2021-02-09 ENCOUNTER — TELEPHONE (OUTPATIENT)
Dept: INTERNAL MEDICINE | Age: 46
End: 2021-02-09

## 2021-02-09 NOTE — TELEPHONE ENCOUNTER
Pt called and wanted to let you know that yesterday her fasting BS was 68 and this morning it was 86

## 2021-02-10 ENCOUNTER — TELEPHONE (OUTPATIENT)
Dept: INTERNAL MEDICINE | Age: 46
End: 2021-02-10

## 2021-02-10 NOTE — TELEPHONE ENCOUNTER
Called pt she voice understood and will stop the [place] and [place] and let us know how her sugar is doing after that.

## 2021-02-12 ENCOUNTER — TELEPHONE (OUTPATIENT)
Dept: INTERNAL MEDICINE | Age: 46
End: 2021-02-12

## 2021-02-12 NOTE — TELEPHONE ENCOUNTER
76 is normal blood sugar and metformin and ozempic dont bottom out blood sugar so just stay on current doses of them and stay off januvia and keep f/u next week

## 2021-02-12 NOTE — TELEPHONE ENCOUNTER
Called and spoke to the  he voice understood the instructions to keep on the same medications and to follow up next week.

## 2021-02-12 NOTE — TELEPHONE ENCOUNTER
Pt called to let us know that her sugar was 76 this morning, Dr Barcenas Sessions had told her earlier this week to stop the Januvia because her sugar was very low. She wants to know if she need to stop using the ozempic? She on her own has been cutting her metformin in half.

## 2021-03-15 DIAGNOSIS — G47.33 SLEEP APNEA, OBSTRUCTIVE: Primary | ICD-10-CM

## 2021-03-15 NOTE — PROGRESS NOTES
[place] [address]  [place], [address]  Phone [phone] Fax [phone]     Patient Name: Coral Chance 3/12/2021  : 1975  Age: 41 y.o.   Patient Address: [address] [place] [address]       Patient Phone: [phone] (home)     REFERRAL  Referred to: DME provider of patient's choice  Coral Chance is referred for the following:    DME Equipment HPCPS Code Setting   Auto Adjusting CPAP device with flex or comparable pressure relief per comfort  10cm to 20cm   Heated Humidifier  Patient Choice       Replinishible PAP Supplies, 1 year supply  Item HPCPS Code Frequency   Mask of choice  or  1 per 3 months   Nasal Mask cushion/pillows  or  2 per 30 days   Full Face Mask Interface  1 per 30 days   Headgear  1 per 6 months   Tubing, length of choice  or  1 per 3 months   Water Chamber  1 per 6 months   Chinstrap  1 per 6 months   Disposable Filters  2 per 30 days   Reusable Filters  1 per 6 months     Diagnoses:  Obstructive sleep apnea (G47.33)  Length of Need: Lifetime, 99    Ordering Provider: JIMMIE Stroud: [id number]         Signature:       Date: 3/12/2021      Electronically Signed by Ashia Perez M.D.

## 2021-03-22 ENCOUNTER — TELEPHONE (OUTPATIENT)
Dept: NEUROLOGY | Age: 46
End: 2021-03-22

## 2021-03-22 NOTE — TELEPHONE ENCOUNTER
Tried calling patient to let her know that I have her scheduled an appointment with Jacob Galindo PA-C for a follow up after being on her DME sleep machine. NO answer. NO VM. Will mail appointment letter.

## 2021-05-24 ENCOUNTER — TELEPHONE (OUTPATIENT)
Dept: NEUROLOGY | Age: 46
End: 2021-05-24

## 2021-05-24 NOTE — TELEPHONE ENCOUNTER
Called and spoke with patient to let her know that her appointment for 05-26-21 with Alan Kelsey had to be rescheduled due to the provider is out sick. I did have patient rescheduled, but patient stated she wanted to cancel for now and will call back later to reschedule due to she is at work.

## 2021-06-04 ENCOUNTER — OFFICE VISIT (OUTPATIENT)
Dept: NEUROLOGY | Age: 46
End: 2021-06-04
Payer: COMMERCIAL

## 2021-06-04 VITALS
SYSTOLIC BLOOD PRESSURE: 126 MMHG | HEIGHT: 62 IN | BODY MASS INDEX: 41.41 KG/M2 | HEART RATE: 60 BPM | RESPIRATION RATE: 16 BRPM | DIASTOLIC BLOOD PRESSURE: 83 MMHG | WEIGHT: 225 LBS

## 2021-06-04 DIAGNOSIS — G47.33 OBSTRUCTIVE SLEEP APNEA: Primary | ICD-10-CM

## 2021-06-04 DIAGNOSIS — Z99.89 CPAP (CONTINUOUS POSITIVE AIRWAY PRESSURE) DEPENDENCE: ICD-10-CM

## 2021-06-04 DIAGNOSIS — G25.81 RESTLESS LEG SYNDROME: ICD-10-CM

## 2021-06-04 PROCEDURE — 99214 OFFICE O/P EST MOD 30 MIN: CPT | Performed by: PHYSICIAN ASSISTANT

## 2021-06-04 NOTE — PROGRESS NOTES
[facility] Neurology and Sleep Medicine  [address] [place] [address] Drive, [address]  [facility], [facility]  Phone [phone]  Fax [phone]       [facility] Sleep Follow Up Encounter      Information:   Patient Name: Apurva Loco  :   1975  Age:   41 y.o. MRN:   [id number]  Account #:  [de-identified]  Today:                21    Provider:  Saleem Charlton PA-C    Chief Complaint   Patient presents with    New Patient    Sleep Apnea        Subjective:   Apurva Loco is a 41 y.o. female  with a history of severe EDUARDA and RLS who comes in for a sleep clinic follow up. She was referred for an HST due to snoring, witnessed apneas, and excessive daytime somnolence. The HST, 2021 revealed an AHI of 62.9. The HST, 2021 revealed an AHI of 59.5. She is prescribed auto CPAP therapy with a pressure range of 10cm to 20cm. The compliance report indicates that she is averaging 2 hours of CPAP use per day. She has not been able to use CPAP secondary to an ill fitting mask. She removes the mask unknowingly. She is using a FFM. She has lost 70 lbs since the HST. She reports that when she was using CPAP consistently she slept better and noticed improvement in the previous EDUARDA symptoms.      Location or symptom:  EDUARDA  Onset:  HST: 2021   Timing:  q hs  Severity:  Severe  Associated:  Snoring, witnessed apneas, and excessive daytime somnolence  Alleviated:  CPAP      Objective:     Past Medical History:   Diagnosis Date    IUD (intrauterine device) in place 10/31/2019    Obesity affecting pregnancy in first trimester 2019    Ovarian cyst        Past Surgical History:   Procedure Laterality Date    DENTAL SURGERY      DILATION AND CURETTAGE      DILATION AND CURETTAGE OF UTERUS      DILATION AND CURETTAGE OF UTERUS N/A 10/30/2020    DILATATION AND CURETTAGE HYSTEROSCOPY ENDOMETRIAL ABLATION performed by Chanda Maria MD at [address] [place] [address] HYSTEROSCOPY      OVARIAN CYST SURGERY         Recent Hospitalizations  ·     Significant Injuries  ·     Family History   Problem Relation Age of Onset    Colon Cancer Mother 54       Social History  Social History     Tobacco Use   Smoking Status Never Smoker   Smokeless Tobacco Never Used     Social History     Substance and Sexual Activity   Alcohol Use Yes    Comment: OCC     Social History     Substance and Sexual Activity   Drug Use No         Current Outpatient Medications   Medication Sig Dispense Refill    Semaglutide,0.25 or 0.5MG/DOS, 2 MG/1.5ML SOPN 0.25 mg SC once weekly for 4 weeks then increase to 0.5 mg once weekly thereafter (Patient not taking: Reported on 6/4/2021) 4 pen 2    metFORMIN (GLUCOPHAGE) 1000 MG tablet Take 1 tablet by mouth 2 times daily (with meals) (Patient not taking: Reported on 6/4/2021) 180 tablet 0    atorvastatin (LIPITOR) 40 MG tablet Take 1 tablet by mouth daily (Patient not taking: Reported on 6/4/2021) 180 tablet 1    escitalopram (LEXAPRO) 20 MG tablet Take 1 tablet by mouth daily (Patient not taking: Reported on 6/4/2021) 90 tablet 1    tiZANidine (ZANAFLEX) 4 MG tablet Take 1 tablet by mouth every 8 hours as needed (neck spasm.) (Patient not taking: Reported on 6/4/2021) 40 tablet 2    blood glucose monitor kit and supplies Dispense sufficient amount for indicated testing frequency plus additional to accommodate PRN testing needs. Dispense all needed supplies to include: monitor, strips, lancing device, lancets, control solutions, alcohol swabs. (Patient not taking: Reported on 6/4/2021) 1 kit 0    blood glucose monitor strips Test 1 times a day & as needed for symptoms of irregular blood glucose. Dispense sufficient amount for indicated testing frequency plus additional to accommodate PRN testing needs. (Patient not taking: Reported on 6/4/2021) 100 strip 3     No current facility-administered medications for this visit. Allergies:  Patient has no known allergies. REVIEW OF SYSTEMS     Constitutional: []? Fever []? Sweats []? Chills []?  Recent Injury   [x]? Denies all unless marked  HENT:[]? Headache  []? Head Injury  []? Sore Throat  []? Ear Pain  []? Dizziness []? Hearing Loss   [x]? Denies all unless marked  Musculoskeletal: []? Arthralgia  []? Myalgias []? Muscle cramps  []? Muscle twitches   [x]? Denies all unless marked   Spine:  []? Neck pain  []? Back pain  []? Sciaticia  [x]? Denies all unless marked  Neurological:[]? Visual Disturbance []? Double Vision []? Slurred Speech []? Trouble swallowing  []? Vertigo []? Tingling []? Numbness []? Weakness []? Loss of Balance   []? Loss of Consciousness []? Memory Loss []? Seizures  [x]? Denies all unless marked  Psychiatric/Behavioral:[]? Depression []? Anxiety  [x]? Denies all unless marked  Sleep: []? Insomnia []? Sleep Disturbance []? Snoring [x]? Restless Legs []? Daytime Sleepiness [x]? Sleep Apnea  []? Denies all unless marked    The MA has completed the ROS with the patient. I have reviewed it in its' entirety with the patient and agree with the documentation. PHYSICAL EXAM  /83   Pulse 60   Resp 16   Ht 5' 1.6\" (1.565 m)   Wt 225 lb (102.1 kg)   BMI 41.69 kg/m²      Constitutional   Alert in NAD, well developed, pleasant and cooperative with exam; body habitus obese as indicated by BMI  Neck circumference:  16.25 inches  HEENT- Conjunctiva normal.  No scars, masses, or lesions over external nose or ears, hearing intact, no neck masses noted, no jugular vein distension, no bruit  Cardiac- Regular rate and rhythm  Pulmonary- Clear to auscultation, good expansion, normal effort without use of accessory muscles  Musculoskeletal  No significant wasting of muscles noted, no bony deformities  Extremities - No clubbing, cyanosis or edema  Skin  Warm, dry, and intact. No rash, erythema, or pallor  Psychiatric  Mood, affect, and behavior appear normal      Neurologic:  Extraocular movements are intact without nystagmus. PERRL. Visual fields are full to confrontation.   Facial movements are symmetrical and normal.  Speech is precise. Extremity strength is normal in both uppers and lowers. Deep tendon reflexes are intact and symmetrical.  Rapid alternating movements are unimpaired. Finger-to-nose testing is performed well, without dysmetria. Gait is normal.    I reviewed the following studies:       []  :  Clinical laboratory test results     []  :  Radiology reports                    [x]  :  Review and summarization of medical records and/or obtain medical records        [x]  :  Previous/recent polysomnogram report(s)     []  :  [place] Sleepiness Scale           [x]  :  Compliance download: The auto CPAP is set at a pressure range of 10cm to 20cm. Compliance download shows that she uses device: 10% of the time;  percentage of days with usage >=4 hours: 0%. AHI: 9.1 (Large leaks noted)-discussed    Assessment:       ICD-10-CM    1. Obstructive sleep apnea  G47.33    2. Restless leg syndrome  G25.81    3. CPAP (continuous positive airway pressure) dependence  Z99.89           [x]  :  Stable-RLS     []  :  Improved                       []  :  Well controlled              []  :  Resolving     []  :  Resolved     [x]  :  Inadequately controlled-EDUARDA     []  :  Worsening     []  :  Additional workup planned        Plan:     No orders of the defined types were placed in this encounter. 1.   Previously or presently advised of the etiology,  pathophysiology, diagnosis, treatment options, and risks of untreated EDUARDA. Risks may include, but are not limited to  hypertension, coronary artery disease, atrial fibrillation, CHF, diabetes, stroke, weight gain, impaired cognition, daytime somnolence, and motor vehicle accidents. Advised to abstain from driving or operating heavy machinery when drowsy and the use of respiratory suppressants. Discussed diagnostic studies and potential treatment plan.  Will evaluate for clinical benefit and and compliance during a 30 day period within the preceding 90 days PRN.  2.  The following educational material has been included in this visit after visit summary for your review: EDUARDA/PAP guidelines/RLS-Discussed with the patient and all questions fully answered. 3.  Discussed the signs, symptoms, and treatment options for RLS/PLMD-defers pharmacotherapy  4. Follow up with [facility] for a [employer] FFM  5.   Follow up in 1 months

## 2021-06-04 NOTE — PATIENT INSTRUCTIONS
Patient education: Sleep apnea in adults       INTRODUCTION  Normally during sleep, air moves through the throat and in and out of the lungs at a regular rhythm. In a person with sleep apnea, air movement is periodically diminished or stopped. There are two types of sleep apnea: obstructive sleep apnea and central sleep apnea. In obstructive sleep apnea, breathing is abnormal because of narrowing or closure of the throat. In central sleep apnea, breathing is abnormal because of a change in the breathing control and rhythm. Sleep apnea is a serious condition that can affect a person's ability to safely perform normal daily activities and can affect long term health. Approximately 25 percent of adults are at risk for sleep apnea of some degree. Men are more commonly affected than women. Other risk factors include middle and older age, being overweight or obese, and having a small mouth and throat. This topic review focuses on the most common type of sleep apnea in adults, obstructive sleep apnea (EDUARDA). HOW SLEEP APNEA OCCURS  The throat is surrounded by muscles that control the airway for speaking, swallowing, and breathing. During sleep, these muscles are less active, and this causes the throat to narrow. In most people, this narrowing does not affect breathing. In others, it can cause snoring, sometimes with reduced or completely blocked airflow. A completely blocked airway without airflow is called an obstructive apnea. Partial obstruction with diminished airflow is called a hypopnea. A person may have apnea and hypopnea during sleep. Insufficient breathing due to apnea or hypopnea causes oxygen levels to fall and carbon dioxide to rise. Because the airway is blocked, breathing faster or harder does not help to improve oxygen levels until the airway is reopened. Typically, the obstruction requires the person to awaken to activate the upper airway muscles.  Once the airway is opened, the person then takes several deep breaths to catch up on breathing. As the person awakens, he or she may move briefly, snort or snore, and take a deep breath. Less frequently, a person may awaken completely with a sensation of gasping, smothering, or choking. If the person falls back to sleep quickly, he or she will not remember the event. Many people with sleep apnea are unaware of their abnormal breathing in sleep, and all patients underestimate how often their sleep is interrupted. Awakening from sleep causes sleep to be unrefreshing and causes fatigue and daytime sleepiness. Anatomic causes of obstructive sleep apnea   Most patients have EDUARDA because of a small upper airway. As the bones of the face and skull develop, some people develop a small lower face, a small mouth, and a tongue that seems too large for the mouth. These features are genetically determined, which explains why EDUARDA tends to cluster in families. Obesity is another major factor. Tonsil enlargement can be an important cause, especially in children. SLEEP APNEA SYMPTOMS  The main symptoms of EDUARDA are loud snoring, fatigue, and daytime sleepiness. However, some people have no symptoms. For example, if the person does not have a bed partner, he or she may not be aware of the snoring. Fatigue and sleepiness have many causes and are often attributed to overwork and increasing age. As a result, a person may be slow to recognize that they have a problem. A bed partner or spouse often prompts the patient to seek medical care. Other symptoms may include one or more of the following:  ?Restless sleep  ? Awakening with choking, gasping, or smothering  ? Morning headaches, dry mouth, or sore throat  ? Waking frequently to urinate  ? Awakening unrested, groggy  ? Low energy, difficulty concentrating, memory impairment    Risk factors  Certain factors increase the risk of sleep apnea.   ?Increasing age [de-identified] EDUARDA occurs at all ages, but it is more common in middle and older age adults. ?Male sex  EDUARDA is two times more common in men, especially in middle age. ?Obesity  The more obese a person is, the more likely he or she is to have EDUARDA. ? Sedation from medication or alcohol  This interferes with the ability to awaken from sleep and can lengthen periods of apnea (no breathing), with potentially dangerous consequences. ? Abnormality of the airway. SLEEP APNEA CONSEQUENCES  Complications of sleep apnea can include daytime sleepiness and difficulty concentrating. The consequence of this is an increased risk of accidents and errors in daily activities. Studies have shown that people with severe EDUARDA are more than twice as likely to be involved in a motor vehicle accident as people without these conditions. People with EDUARDA are encouraged to discuss options for driving, working, and performing other high-risk tasks with a healthcare provider. In addition, people with untreated EDUARDA may have an increased risk of cardiovascular problems such as high blood pressure, heart attack, abnormal heart rhythms, or stroke. This risk may be due to changes in the heart rate and blood pressure that occur during sleep. SLEEP APNEA DIAGNOSIS  The diagnosis of EDUARDA is best made by a knowledgeable sleep medicine specialist who has an understanding of the individual's health issues. The diagnosis is usually based upon the person's medical history, physical examination, and testing, including:  ? A complaint of snoring and ineffective sleep  ? Neck size (greater than 16 inches in men or 14 inches in women) is associated with an increased risk of sleep apnea  ? A small upper airway: difficulty seeing the throat because of a tongue that is large for the mouth  ? High blood pressure, especially if it is resistant to treatment  ? If a bed partner has observed the patient during episodes of stopped breathing (apnea), choking, or gasping during sleep, there is a strong possibility of sleep apnea.     Testing is usually performed in a sleep laboratory. A full sleep study is called a polysomnogram. The polysomnogram measures the breathing effort and airflow, blood oxygen level, heart rate and rhythm, duration of the various stages of sleep, body position, and movement of the arms/legs. Home monitoring devices are available that can perform a sleep study. This is a reasonable alternative to conventional testing in a sleep laboratory if the clinician strongly suspects moderate or severe sleep apnea and the patient does not have other illnesses or sleep disorders that may interfere with the results. SLEEP APNEA TREATMENT  Sleep apnea is best treated by a knowledgeable sleep medicine specialist. The goal of treatment is to maintain an open airway during sleep. Effective treatment will eliminate the symptoms of sleep disturbance; long-term health consequences are also reduced. Most treatments require nightly use. The challenge for the clinician and the patient is to select an effective therapy that is appropriate for the patient's problem and that is acceptable for long term use. Auto-titrating CPAP delivers an amount of PAP that varies during the night. The variation is dependent on event detection software algorithms, which will increase the pressure gradually in response to flow changes until adequate patency is detected. After a period of sustained upper airway patency, the delivered level of pressure gradually decreases until the algorithm identifies recurrent upper airway obstruction, at which point the delivered pressure again increases. The result is that the delivered pressure varies throughout the night, in an effort to provide the lowest pressure that is necessary to maintain upper airway patency. Continuous positive airway pressure (CPAP)  The most effective treatment for sleep apnea uses air pressure from a mechanical device to keep the upper airway open during sleep.  A CPAP (continuous positive airway pressure)  device uses an air-tight attachment to the nose, typically a mask, connected to a tube and a blower which generates the pressure. Devices that fit comfortably into the nasal opening, rather than over the nose, are also available. CPAP should be used any time the person sleeps (day or night). The CPAP device is usually used for the first time in the sleep lab, where a technician can adjust the pressure and select the best equipment to keep the airway open. Alternatively, an auto device with a self-adjusting pressure feature, provided with proper education and training, can get treatment started without another sleep test. While the treatment may seem uncomfortable, noisy, or bulky at first, most people accept the treatment after experiencing better sleep. However, difficulty with mask comfort and nasal congestion prevent up to 50 percent of people from using the treatment on a regular basis. Continued follow up with a healthcare provider helps to ensure that the treatment is effective and comfortable. Information from the CPAP machine is often used by physicians, therapists, and insurers to track the success of treatment. CPAP can be delivered with different features to improve comfort and solve problems that may come up during treatment. Changes in treatment may be needed if symptoms do not improve or if the persons condition changes, such as a gain or loss of weight. Adjust sleep position  Adjusting sleep position (to stay off the back) may help improve sleep quality in people who have EDUARDA when sleeping on the back. However, this is difficult to maintain throughout the night and is rarely an adequate solution. Weight loss  Weight loss may be helpful for obese or overweight patients. Weight loss may be accomplished with dietary changes, exercise, and/or surgical treatment.  However, it can be difficult to maintain weight loss; the five-year success of non-surgical weight loss is only 5 percent, meaning that 95 percent of people regain lost weight. Avoid alcohol and other sedatives  Alcohol can worsen sleepiness, potentially increasing the risk of accidents or injury. People with EDUARDA are often counseled to drink little to no alcohol, even during the daytime. Similarly, people who take anti-anxiety medications or sedatives to sleep should speak with their healthcare provider about the safety of these medications. People with EDUARDA must notify all healthcare providers, including surgeons, about their condition and the potential risks of being sedated. People with EDUARDA who are given anesthesia and/or pain medications require special management and close monitoring to reduce the risk of a blocked airway. Dental devices  A dental device, called an oral appliance or mandibular advancement device, can reposition the jaw (mandible), bringing the tongue and soft palate forward as well. This may relieve obstruction in some people. This treatment is excellent for reducing snoring, although the effect on EDUARDA is sometimes more limited. As a result, dental devices are best used for mild cases of EDUARDA when relief of snoring is the main goal. Failure to tolerate and accept CPAP is another indication for dental devices. While dental devices are not as effective as CPAP for EDUARDA, some patients prefer a dental device to CPAP. Side effects of dental devices are generally minor but may include changes to the bite with prolonged use. Surgical treatment  Surgery is an alternative therapy for patients who cannot tolerate or do not improve with nonsurgical treatments such as CPAP or oral devices. Surgery can also be used in combination with other nonsurgical treatments. Surgical procedures reshape structures in the upper airways or surgically reposition bone or soft tissue. Uvulopalatopharyngoplasty (UPPP) removes the uvula and excessive tissue in the throat, including the tonsils, if present.  Other procedures, such as maxillomandibular advancement (MMA), address both the upper and lower pharyngeal airway more globally. UPPP alone has limited success rates (less than 50 percent) and people can relapse (when EDUARDA symptoms return after surgery). As a result, this surgery is only recommended in a minority of people and should be considered with caution. MMA may have a higher success rate, particularly in people with abnormal jaw (maxilla and mandible) anatomy, but it is the most complicated procedure. A newer surgical approach, nerve stimulation to protrude the tongue, has promising success rates in very selected people. Tracheostomy creates a permanent opening in the neck. It is reserved for people with severe disease in whom less drastic measures have failed or are inappropriate. Although it is always successful in eliminating obstructive sleep apnea, tracheostomy requires significant lifestyle changes and carries some serious risks (eg, infection, bleeding, blockage). All surgical treatments require discussions about the goals of treatment, the expected outcomes, and potential complications. Hypoglossal nerve stimulator- \"Inspire\" device    PAP treatment failure:  Possible causes of treatment failure include nonadherence or suboptimal adherence, weight gain, an inappropriate level of prescribed positive pressure, or an additional disorder causing sleepiness (eg, narcolepsy) that may require alterations in the therapeutic regimen. A review of medications should also be undertaken since many drugs may lead to sleepiness. Inadequate sleep time may also negate the expected effects from treatment of EDUARDA. Also, pt's can have persistent hypersomnolence associated with sleep apnea even in the presence of adequate therapy and at those times Provigil or Nuvigil or other stimulants may be indicated.     Once the patient's positive airway pressure therapy has been optimized and symptoms resolved, a regimen of long-term follow-up should be established. Annual visits are reasonable, with more frequent visits in between if new issues arise. The purpose of long-term follow-up is to assess usage and monitor for recurrent EDUARDA, new side effects, air leakage, and fluctuations in body weight. WHERE TO GET MORE INFORMATION  Your healthcare provider is the best source of information for questions and concerns related to your medical problem. Organizations  American [employer] Association  Provides information about sleep apnea to the public, publishes a newsletter, and serves as an advocate for people with the disorder. [place], [address]   Chris@[employer]. org   [employer].. org   Tel: [phone]   Fax: [facility] that works to [employer] and safety by promoting public understanding of sleep and sleep disorders. Supports sleep-related education, research, and advocacy; produces and distributes educational materials to the public and healthcare professionals; and offers postdoctoral fellowships and grants for sleep researchers. [address]   Jacqui@placespourtous.com. org   [employer]. org   Tel: [phone]   Fax: [phone]    Important information:  Medicare/private insurance CPAP/BiPAP/APAP requirements:  Medicare/private insurance has specific requirements for PAP compliance that must be met during the first 90 days of use to continue coverage for CPAP/BiPAP/APAP  from day 91 and beyond. The policy requires that patients use a PAP device 4 hours per 24 hour period, at least 70% of the time over a 30 day period. This data must be downloaded as a report direct from the PAP devices. This is called a compliance download. Your PAP supplier will assist you in this matter.      Reminder:  Please bring a copy of the compliance download to your next office visit or have your supplier fax it to our office prior to your office visit. Note:  Where applicable, we will utilize PAP device efficiency reports, additional testing, and face-to-face  clinical evaluation subsequent to any treatment, changes in treatment, and continued treatment. Caution:  Please abstain from driving or engaging in other activities which may be hazardous in the presence of diminished alertness or daytime drowsiness. And avoid the use of sedatives or alcohol, which can worsen sleep apnea and daytime drowsiness. Mask suggestions:  -     Resmed Airfit N20 (Nasal) or F20 (Full face mask). They conform to your face, thus decreasing the potential for mask leakage. You might like the AirTouch F20(full face mask). It has a \"memory foam\" like cushion. The AirFit F30 is a smaller style full face mask designed to sit low on and cover less of your face for fewer facial marks. AirFit N30i has a top of the head tube with a nasal mask. AirFit P10 reported to be the most comfortable nasal pillow mask. Resmed Mirage FX reported to be the most comfortable nasal mask. Resmed Mirage Sidon reported to be the most comfortable hybrid mask. AirTouch N20-memory foam nasal mask. Respironics: You might also like to try a nasal mask called a Dreamwear nasal mask or the Dreamwear nasal pillow. Another suggestion is the [facility], it is a minimal contact full face mask. The [employer] incredible under the nose design makes it the only full face mask that won't cause red marks on the bridge of your nose when compared to other full face masks. The Dreamwear full face mask has a  soft feel, unique in-frame air-flow, and innovative air tube connection at the top of the head for the ultimate in sleep comfort. Comfort Gel Blue. Dreamwear gel pillows. [employer]: Brevida nasal pillow mask and Simplus FFM    The use of a memory foam CPAP pillow supports the head and neck throughout the night.           Patient education: Restless legs syndrome     Written by the doctors and editors at [facility]     What is restless legs syndrome?  Restless legs syndrome, or RLS for short, is a condition that causes strange sensations in your legs. RLS is also sometimes called \"Guan-Ekbom disease. \" If you have RLS, you probably have the urge to move your legs at night. This can make it hard to get comfortable and fall asleep. In some cases, RLS happens on its own and seems to be passed on in families. In other cases, the condition seems to be linked to other medical problems. For instance, a condition called \"anemia,\" in which there is too little iron in the blood, seems to increase the risk of RLS. Other conditions that increase the risk of RLS include kidney disease, diabetes, and multiple sclerosis. Pregnancy seems to increase a woman's risk of developing RLS, too. What are the symptoms of RLS?  People who have RLS get an uncomfortable urge to move their legs when they are at rest. They describe the feeling as crawling, creeping, pulling, or itching. And they say the feeling is deep in the legs  not on the skin  usually below the knees. These symptoms usually get worse as the day moves on, and they are worst at night. The symptoms can be especially bad when trying to stay still to read a book, watch television, or fall asleep. But people can make the feeling go away temporarily if they walk around or move their legs. Some people with RLS find that their legs move on their own while they are asleep. In short, the symptoms:  ? Happen when you are at rest  ?Go away if you move your legs on purpose  ? Are worst at night  ? Sometimes include the legs moving on their own during sleep  Together, the symptoms of RLS can make it hard to get a good night's sleep. People with the condition often feel tired during the day. Is there a test for RLS?  There is a test, but it is not usually necessary.  Your doctor or nurse should be able to tell if you have it by asking about your symptoms and doing an exam. Still, it is possible that your doctor or nurse will decide to send you for a \"sleep study,\" to be sure of what is happening. For a sleep study, you spend the night in a lab, and you are hooked up to different machines that monitor your movements, heart rate, breathing, and other body functions. Is there anything I can do on my own to feel better?  Yes. You might feel better if you:  ? Do activities that keep your mind alert during the day, such as crossword puzzles  ? Get moderate regular exercise  ? Massage your legs (or have someone massage them)  ? Apply heat to your legs with heating pads or by taking a warm bath  ? Avoid taking medicines that can make RLS worse  These include antihistamines like diphenhydramine (sample brand name: Benadryl). Should I see a doctor or nurse?  See your doctor or nurse if your condition bothers you, or if it keeps you from getting a good night's sleep. How is RLS treated?  Some people with RLS do not need medicine for it because they have mild symptoms that don't bother them very often. If treatment is needed, there are a number of medicines doctors can suggest. Examples include:  ?Iron supplements  ? Pramipexole (brand name: Mirapex)  ? Ropinirole (brand name: Cam Leaks)  ? Rotigotine (brand name: Neupro)  ? Carbidopa-levodopa (brand name: Sinemet)  ? Gabapentin enacarbil (brand name: Raeann Nian)  ? Gabapentin (brand name: Neurontin)  ? Pregabalin (brand name: Lyrica)  In people with RLS who also have a severe form of kidney disease called kidney failure, the RLS might improve with a treatment called hemodialysis (also known as just dialysis). What if I am pregnant?  If you are pregnant, you can take iron supplements and try the other tips that do not involve taking prescription medicines. Most of the medicines used to treat RLS are not proven to be safe to take during pregnancy.  If your symptoms are bad, there are some medicines that might be OK to take. But keep in mind that the condition usually goes away or gets much better after you give birth.

## 2021-07-13 ENCOUNTER — OFFICE VISIT (OUTPATIENT)
Dept: NEUROLOGY | Age: 46
End: 2021-07-13
Payer: COMMERCIAL

## 2021-07-13 VITALS
SYSTOLIC BLOOD PRESSURE: 127 MMHG | DIASTOLIC BLOOD PRESSURE: 73 MMHG | TEMPERATURE: 97.8 F | HEIGHT: 62 IN | OXYGEN SATURATION: 98 % | HEART RATE: 63 BPM | WEIGHT: 225 LBS | BODY MASS INDEX: 41.41 KG/M2

## 2021-07-13 DIAGNOSIS — G25.81 RESTLESS LEG SYNDROME: ICD-10-CM

## 2021-07-13 DIAGNOSIS — G47.33 OBSTRUCTIVE SLEEP APNEA: Primary | ICD-10-CM

## 2021-07-13 DIAGNOSIS — Z78.9 DIFFICULTY WITH CPAP USE: ICD-10-CM

## 2021-07-13 PROCEDURE — 99213 OFFICE O/P EST LOW 20 MIN: CPT | Performed by: PHYSICIAN ASSISTANT

## 2021-07-13 NOTE — LETTER
[address], [address] [place] [address]  Phone [phone]           Re:  Blas Kellogg    22  :  1975  Address: [address] [place] [address]       Replinishible PAP Supplies, 1 year supply  Item HPCPS Code Frequency   Mask of choice  or  1 per 3 months   Nasal Mask cushion/pillows  or  2 per 30 days   Full Face Mask Interface  1 per 30 days   Headgear  1 per 6 months   Tubing, length of choice  or  1 per 3 months   Water Chamber  1 per 6 months   Chinstrap  1 per 6 months   Disposable Filters  2 per 30 days   Reusable Filters  1 per 6 months     Diagnoses:  Obstructive sleep apnea (G47.33)  Length of Need: Lifetime, 99    Ordering Provider: Alex Lobato PA-C  NPI:  [id number]    NOTE:  The last 2 masks have torn and she needs a replacement, please.      Signature:         Date: 2021      Electronically Signed by Alex Lobato PA-C  on 2021 at 9:51 AM Simple / Intermediate / Complex Repair - Final Wound Length In Cm: 3.6

## 2021-07-13 NOTE — PATIENT INSTRUCTIONS
Patient education: Sleep apnea in adults       INTRODUCTION  Normally during sleep, air moves through the throat and in and out of the lungs at a regular rhythm. In a person with sleep apnea, air movement is periodically diminished or stopped. There are two types of sleep apnea: obstructive sleep apnea and central sleep apnea. In obstructive sleep apnea, breathing is abnormal because of narrowing or closure of the throat. In central sleep apnea, breathing is abnormal because of a change in the breathing control and rhythm. Sleep apnea is a serious condition that can affect a person's ability to safely perform normal daily activities and can affect long term health. Approximately 25 percent of adults are at risk for sleep apnea of some degree. Men are more commonly affected than women. Other risk factors include middle and older age, being overweight or obese, and having a small mouth and throat. This topic review focuses on the most common type of sleep apnea in adults, obstructive sleep apnea (EDUARDA). HOW SLEEP APNEA OCCURS  The throat is surrounded by muscles that control the airway for speaking, swallowing, and breathing. During sleep, these muscles are less active, and this causes the throat to narrow. In most people, this narrowing does not affect breathing. In others, it can cause snoring, sometimes with reduced or completely blocked airflow. A completely blocked airway without airflow is called an obstructive apnea. Partial obstruction with diminished airflow is called a hypopnea. A person may have apnea and hypopnea during sleep. Insufficient breathing due to apnea or hypopnea causes oxygen levels to fall and carbon dioxide to rise. Because the airway is blocked, breathing faster or harder does not help to improve oxygen levels until the airway is reopened. Typically, the obstruction requires the person to awaken to activate the upper airway muscles.  Once the airway is opened, the person then takes several deep breaths to catch up on breathing. As the person awakens, he or she may move briefly, snort or snore, and take a deep breath. Less frequently, a person may awaken completely with a sensation of gasping, smothering, or choking. If the person falls back to sleep quickly, he or she will not remember the event. Many people with sleep apnea are unaware of their abnormal breathing in sleep, and all patients underestimate how often their sleep is interrupted. Awakening from sleep causes sleep to be unrefreshing and causes fatigue and daytime sleepiness. Anatomic causes of obstructive sleep apnea   Most patients have EDUARDA because of a small upper airway. As the bones of the face and skull develop, some people develop a small lower face, a small mouth, and a tongue that seems too large for the mouth. These features are genetically determined, which explains why EDUARDA tends to cluster in families. Obesity is another major factor. Tonsil enlargement can be an important cause, especially in children. SLEEP APNEA SYMPTOMS  The main symptoms of EDUARDA are loud snoring, fatigue, and daytime sleepiness. However, some people have no symptoms. For example, if the person does not have a bed partner, he or she may not be aware of the snoring. Fatigue and sleepiness have many causes and are often attributed to overwork and increasing age. As a result, a person may be slow to recognize that they have a problem. A bed partner or spouse often prompts the patient to seek medical care. Other symptoms may include one or more of the following:  ?Restless sleep  ? Awakening with choking, gasping, or smothering  ? Morning headaches, dry mouth, or sore throat  ? Waking frequently to urinate  ? Awakening unrested, groggy  ? Low energy, difficulty concentrating, memory impairment    Risk factors  Certain factors increase the risk of sleep apnea.   ?Increasing age [de-identified] EDUARDA occurs at all ages, but it is more common in middle and older age adults. ?Male sex  EDUARDA is two times more common in men, especially in middle age. ?Obesity  The more obese a person is, the more likely he or she is to have EDUARDA. ? Sedation from medication or alcohol  This interferes with the ability to awaken from sleep and can lengthen periods of apnea (no breathing), with potentially dangerous consequences. ? Abnormality of the airway. SLEEP APNEA CONSEQUENCES  Complications of sleep apnea can include daytime sleepiness and difficulty concentrating. The consequence of this is an increased risk of accidents and errors in daily activities. Studies have shown that people with severe EDUARDA are more than twice as likely to be involved in a motor vehicle accident as people without these conditions. People with EDUARDA are encouraged to discuss options for driving, working, and performing other high-risk tasks with a healthcare provider. In addition, people with untreated EDUARDA may have an increased risk of cardiovascular problems such as high blood pressure, heart attack, abnormal heart rhythms, or stroke. This risk may be due to changes in the heart rate and blood pressure that occur during sleep. SLEEP APNEA DIAGNOSIS  The diagnosis of EDUARDA is best made by a knowledgeable sleep medicine specialist who has an understanding of the individual's health issues. The diagnosis is usually based upon the person's medical history, physical examination, and testing, including:  ? A complaint of snoring and ineffective sleep  ? Neck size (greater than 16 inches in men or 14 inches in women) is associated with an increased risk of sleep apnea  ? A small upper airway: difficulty seeing the throat because of a tongue that is large for the mouth  ? High blood pressure, especially if it is resistant to treatment  ? If a bed partner has observed the patient during episodes of stopped breathing (apnea), choking, or gasping during sleep, there is a strong possibility of sleep apnea.     Testing is usually performed in a sleep laboratory. A full sleep study is called a polysomnogram. The polysomnogram measures the breathing effort and airflow, blood oxygen level, heart rate and rhythm, duration of the various stages of sleep, body position, and movement of the arms/legs. Home monitoring devices are available that can perform a sleep study. This is a reasonable alternative to conventional testing in a sleep laboratory if the clinician strongly suspects moderate or severe sleep apnea and the patient does not have other illnesses or sleep disorders that may interfere with the results. SLEEP APNEA TREATMENT  Sleep apnea is best treated by a knowledgeable sleep medicine specialist. The goal of treatment is to maintain an open airway during sleep. Effective treatment will eliminate the symptoms of sleep disturbance; long-term health consequences are also reduced. Most treatments require nightly use. The challenge for the clinician and the patient is to select an effective therapy that is appropriate for the patient's problem and that is acceptable for long term use. Auto-titrating CPAP delivers an amount of PAP that varies during the night. The variation is dependent on event detection software algorithms, which will increase the pressure gradually in response to flow changes until adequate patency is detected. After a period of sustained upper airway patency, the delivered level of pressure gradually decreases until the algorithm identifies recurrent upper airway obstruction, at which point the delivered pressure again increases. The result is that the delivered pressure varies throughout the night, in an effort to provide the lowest pressure that is necessary to maintain upper airway patency. Continuous positive airway pressure (CPAP)  The most effective treatment for sleep apnea uses air pressure from a mechanical device to keep the upper airway open during sleep.  A CPAP (continuous positive airway pressure)  device uses an air-tight attachment to the nose, typically a mask, connected to a tube and a blower which generates the pressure. Devices that fit comfortably into the nasal opening, rather than over the nose, are also available. CPAP should be used any time the person sleeps (day or night). The CPAP device is usually used for the first time in the sleep lab, where a technician can adjust the pressure and select the best equipment to keep the airway open. Alternatively, an auto device with a self-adjusting pressure feature, provided with proper education and training, can get treatment started without another sleep test. While the treatment may seem uncomfortable, noisy, or bulky at first, most people accept the treatment after experiencing better sleep. However, difficulty with mask comfort and nasal congestion prevent up to 50 percent of people from using the treatment on a regular basis. Continued follow up with a healthcare provider helps to ensure that the treatment is effective and comfortable. Information from the CPAP machine is often used by physicians, therapists, and insurers to track the success of treatment. CPAP can be delivered with different features to improve comfort and solve problems that may come up during treatment. Changes in treatment may be needed if symptoms do not improve or if the persons condition changes, such as a gain or loss of weight. Adjust sleep position  Adjusting sleep position (to stay off the back) may help improve sleep quality in people who have EDUARDA when sleeping on the back. However, this is difficult to maintain throughout the night and is rarely an adequate solution. Weight loss  Weight loss may be helpful for obese or overweight patients. Weight loss may be accomplished with dietary changes, exercise, and/or surgical treatment.  However, it can be difficult to maintain weight loss; the five-year success of non-surgical weight loss is only 5 percent, meaning that 95 percent of people regain lost weight. Avoid alcohol and other sedatives  Alcohol can worsen sleepiness, potentially increasing the risk of accidents or injury. People with EDUARDA are often counseled to drink little to no alcohol, even during the daytime. Similarly, people who take anti-anxiety medications or sedatives to sleep should speak with their healthcare provider about the safety of these medications. People with EDUARDA must notify all healthcare providers, including surgeons, about their condition and the potential risks of being sedated. People with EDUARDA who are given anesthesia and/or pain medications require special management and close monitoring to reduce the risk of a blocked airway. Dental devices  A dental device, called an oral appliance or mandibular advancement device, can reposition the jaw (mandible), bringing the tongue and soft palate forward as well. This may relieve obstruction in some people. This treatment is excellent for reducing snoring, although the effect on EDUARDA is sometimes more limited. As a result, dental devices are best used for mild cases of EDUARDA when relief of snoring is the main goal. Failure to tolerate and accept CPAP is another indication for dental devices. While dental devices are not as effective as CPAP for EDUARDA, some patients prefer a dental device to CPAP. Side effects of dental devices are generally minor but may include changes to the bite with prolonged use. Surgical treatment  Surgery is an alternative therapy for patients who cannot tolerate or do not improve with nonsurgical treatments such as CPAP or oral devices. Surgery can also be used in combination with other nonsurgical treatments. Surgical procedures reshape structures in the upper airways or surgically reposition bone or soft tissue. Uvulopalatopharyngoplasty (UPPP) removes the uvula and excessive tissue in the throat, including the tonsils, if present.  Other procedures, such as maxillomandibular advancement (MMA), address both the upper and lower pharyngeal airway more globally. UPPP alone has limited success rates (less than 50 percent) and people can relapse (when EDUARDA symptoms return after surgery). As a result, this surgery is only recommended in a minority of people and should be considered with caution. MMA may have a higher success rate, particularly in people with abnormal jaw (maxilla and mandible) anatomy, but it is the most complicated procedure. A newer surgical approach, nerve stimulation to protrude the tongue, has promising success rates in very selected people. Tracheostomy creates a permanent opening in the neck. It is reserved for people with severe disease in whom less drastic measures have failed or are inappropriate. Although it is always successful in eliminating obstructive sleep apnea, tracheostomy requires significant lifestyle changes and carries some serious risks (eg, infection, bleeding, blockage). All surgical treatments require discussions about the goals of treatment, the expected outcomes, and potential complications. Hypoglossal nerve stimulator- \"Inspire\" device    PAP treatment failure:  Possible causes of treatment failure include nonadherence or suboptimal adherence, weight gain, an inappropriate level of prescribed positive pressure, or an additional disorder causing sleepiness (eg, narcolepsy) that may require alterations in the therapeutic regimen. A review of medications should also be undertaken since many drugs may lead to sleepiness. Inadequate sleep time may also negate the expected effects from treatment of EDUARDA. Also, pt's can have persistent hypersomnolence associated with sleep apnea even in the presence of adequate therapy and at those times Provigil or Nuvigil or other stimulants may be indicated.     Once the patient's positive airway pressure therapy has been optimized and symptoms resolved, a regimen of long-term follow-up should be established. Annual visits are reasonable, with more frequent visits in between if new issues arise. The purpose of long-term follow-up is to assess usage and monitor for recurrent EDUARDA, new side effects, air leakage, and fluctuations in body weight. WHERE TO GET MORE INFORMATION  Your healthcare provider is the best source of information for questions and concerns related to your medical problem. Organizations  [employer] Association  Provides information about sleep apnea to the public, publishes a newsletter, and serves as an advocate for people with the disorder. [place], [address], [address] [place] [address] St [email or web address]   [employer].. org   Tel: [phone]   Fax: [facility] that works to [employer] and safety by promoting public understanding of sleep and sleep disorders. Supports sleep-related education, research, and advocacy; produces and distributes educational materials to the public and healthcare professionals; and offers postdoctoral fellowships and grants for sleep researchers. [address]   [email or web address]   [email or web address]   Tel: [phone]   Fax: [phone]    Important information:  Medicare/private insurance CPAP/BiPAP/APAP requirements:  Medicare/private insurance has specific requirements for PAP compliance that must be met during the first 90 days of use to continue coverage for CPAP/BiPAP/APAP  from day 91 and beyond. The policy requires that patients use a PAP device 4 hours per 24 hour period, at least 70% of the time over a 30 day period. This data must be downloaded as a report direct from the PAP devices. This is called a compliance download. Your PAP supplier will assist you in this matter.      Note:  Where applicable, we will utilize PAP device efficiency reports, additional testing, and face-to-face  clinical evaluation subsequent to any treatment, changes in treatment, and continued treatment. Caution:  Please abstain from driving or engaging in other activities which may be hazardous in the presence of diminished alertness or daytime drowsiness. And avoid the use of sedatives or alcohol, which can worsen sleep apnea and daytime drowsiness. Mask suggestions:  -     Resmed Airfit N20 (Nasal) or F20 (Full face mask). They conform to your face, thus decreasing the potential for mask leakage. You might like the AirTouch F20(full face mask). It has a \"memory foam\" like cushion. The AirFit F30 is a smaller style full face mask designed to sit low on and cover less of your face for fewer facial marks. AirFit N30i has a top of the head tube with a nasal mask. AirFit P10 reported to be the most comfortable nasal pillow mask. Resmed Mirage FX reported to be the most comfortable nasal mask. Resmed Mirage [place] reported to be the most comfortable hybrid mask. AirTouch N20-memory foam nasal mask. Respironics: You might also like to try a nasal mask called a Dreamwear nasal mask or the Dreamwear nasal pillow. Another suggestion is the [facility], it is a minimal contact full face mask. The Joe Senthil incredible under the nose design makes it the only full face mask that won't cause red marks on the bridge of your nose when compared to other full face masks. The Dreamwear full face mask has a  soft feel, unique in-frame air-flow, and innovative air tube connection at the top of the head for the ultimate in sleep comfort. Comfort Gel Blue. Dreamwear gel pillows. [employer]: Brevida nasal pillow mask and Simplus FFM    The use of a memory foam CPAP pillow supports the head and neck throughout the night. Patient Education        Restless Legs Syndrome: Care Instructions  Your Care Instructions  Restless legs syndrome is a common nervous system problem.  People with this syndrome feel a creeping, achy, or unpleasant feeling in the legs and an overpowering urge to move them. It often occurs in the evening and at night and can lead to sleep problems and tiredness. Your doctor may suggest doing a study of your sleep patterns to figure out what is happening when you try to sleep. Many people get relief from symptoms when they get regular exercise, eat well, and avoid caffeine, alcohol, and tobacco.  Follow-up care is a key part of your treatment and safety. Be sure to make and go to all appointments, and call your doctor if you are having problems. It's also a good idea to know your test results and keep a list of the medicines you take. How can you care for yourself at home? · Take your medicines exactly as prescribed. Call your doctor if you think you are having a problem with your medicine. · Try bathing in hot or cold water. Applying a heating pad or ice bag to your legs may also help symptoms. · Stretch and massage your legs before bed or when discomfort begins. · Get some exercise for at least 30 minutes a day on most days of the week. Stop exercising at least 3 hours before bedtime. · Try to plan for situations where you will need to remain seated for long stretches. For example, if you are traveling by car, plan some stops so you can get out and walk around. · Tell your doctor about any medicines you are taking. This includes all over-the-counter, prescription, and herbal medicines. Some medicines, such as antidepressants, antihistamines, and cold and sinus medicines, can make your symptoms worse. · Avoid caffeine products, such as coffee, tea, cola, and chocolate. Caffeine can interrupt your sleep and stimulate you. · Do not smoke. Nicotine can make restless legs worse. If you need help quitting, talk to your doctor about stop-smoking programs and medicines. These can increase your chances of quitting for good. · Do not drink alcohol late in the evening.   Take steps to help you sleep better  · Get plenty of sunlight in the outdoors, particularly later in the afternoon. · Use the evening hours for settling down. Avoid activities that challenge you in the hours before bedtime. · Eat meals at regular times, and do not snack before bedtime. · Keep your bedroom quiet, dark, and cool. Try using a sleep mask and earplugs to help you sleep. · Limit how much you drink at night to reduce your need to get up to urinate. But do not go to bed thirsty. · Run a fan or other steady \"white noise\" during the night if noises wake you up. · Buhl the bed for sleeping and sex. Do your reading or TV watching in another room. · Once you are in bed, relax from head to toe, and guide your mind to pleasant thoughts. · Do not stay in bed longer than 8 hours, and try to avoid naps. When should you call for help? Watch closely for changes in your health, and be sure to contact your doctor if:    · You are still not getting enough sleep.     · Your symptoms become more severe or happen more often. Where can you learn more? Go to [email or web address] [email or web address] org and sign in to your [employer] account. Enter Q113 in the [facility] box to learn more about \"Restless Legs Syndrome: Care Instructions. \"     If you do not have an account, please click on the \"Sign Up Now\" link. Current as of: August 4, 2020               Content Version: 12.9  © 3558-6394 [employer]. Care instructions adapted under license by [facility]). If you have questions about a medical condition or this instruction, always ask your healthcare professional. Mario Alfaro any warranty or liability for your use of this information.

## 2021-07-13 NOTE — PROGRESS NOTES
[id number] [facility]  [address] [place] [address] Drive, [address]  Bebeto Hunt  Phone [phone]  Fax [phone]       [facility] Sleep Follow Up Encounter      Information:   Patient Name: Olena Hashimoto  :   1975  Age:   41 y.o. MRN:   [id number]  Account #:  [de-identified]  Today:                21    Provider:  Maldonado Shi PA-C    Chief Complaint   Patient presents with    Sleep Apnea     1 month follow up         Subjective:   Olena Hashimoto is a 41 y.o. female  with a history of severe EDUARDA and RLS who comes in for a sleep clinic follow up. She was referred for an HST due to snoring, witnessed apneas, and excessive daytime somnolence. The HST, 2021 revealed an AHI of 62.9. The HST, 2021 revealed an AHI of 59.5. She is prescribed auto CPAP therapy with a pressure range of 10cm to 20cm. At her last office visit, she was to obtain a new mask. She did not meet compliance then and has returned for a follow up compliance evaluation. The most recent compliance report indicates that she is has used CPAP 4 out of the last 30 days. She did obtain a new mask but the mask was defective. She tried a second mask and it was defective, so she stopped using CPAP. She reports that she has lost 80 lbs since the HST. She reports that when she was using CPAP consistently she slept better and noticed improvement in the previous EDUARDA symptoms. The RLS is stable.      Location or symptom:  EDUARDA  Onset:  HST: 2021   Timing:  q hs  Severity:  Severe  Associated:  Snoring, witnessed apneas, and excessive daytime somnolence  Alleviated:  CPAP      Objective:     Past Medical History:   Diagnosis Date    IUD (intrauterine device) in place 10/31/2019    Obesity affecting pregnancy in first trimester 2019    Ovarian cyst        Past Surgical History:   Procedure Laterality Date    DENTAL SURGERY      DILATION AND CURETTAGE      DILATION AND CURETTAGE OF UTERUS      DILATION AND CURETTAGE OF UTERUS N/A 10/30/2020    DILATATION AND CURETTAGE HYSTEROSCOPY ENDOMETRIAL ABLATION performed by Nialm Jeffers MD at [facility] HYSTEROSCOPY      OVARIAN CYST SURGERY         Recent Hospitalizations  ·     Significant Injuries  ·     Family History   Problem Relation Age of Onset    Colon Cancer Mother 54       Social History  Social History     Tobacco Use   Smoking Status Never Smoker   Smokeless Tobacco Never Used     Social History     Substance and Sexual Activity   Alcohol Use Yes    Comment: OCC     Social History     Substance and Sexual Activity   Drug Use No         Current Outpatient Medications   Medication Sig Dispense Refill    Semaglutide,0.25 or 0.5MG/DOS, 2 MG/1.5ML SOPN 0.25 mg SC once weekly for 4 weeks then increase to 0.5 mg once weekly thereafter 4 pen 2    tiZANidine (ZANAFLEX) 4 MG tablet Take 1 tablet by mouth every 8 hours as needed (neck spasm.) 40 tablet 2    blood glucose monitor kit and supplies Dispense sufficient amount for indicated testing frequency plus additional to accommodate PRN testing needs. Dispense all needed supplies to include: monitor, strips, lancing device, lancets, control solutions, alcohol swabs. 1 kit 0    blood glucose monitor strips Test 1 times a day & as needed for symptoms of irregular blood glucose. Dispense sufficient amount for indicated testing frequency plus additional to accommodate PRN testing needs. 100 strip 3    metFORMIN (GLUCOPHAGE) 1000 MG tablet Take 1 tablet by mouth 2 times daily (with meals) (Patient not taking: Reported on 6/4/2021) 180 tablet 0    atorvastatin (LIPITOR) 40 MG tablet Take 1 tablet by mouth daily (Patient not taking: Reported on 6/4/2021) 180 tablet 1    escitalopram (LEXAPRO) 20 MG tablet Take 1 tablet by mouth daily (Patient not taking: Reported on 6/4/2021) 90 tablet 1     No current facility-administered medications for this visit. Allergies:  Patient has no known allergies.     REVIEW OF SYSTEMS     Constitutional: []?Fever []? Sweats []? Chills []? Recent Injury   [x]? Denies all unless marked  HENT:[]? Headache  []? Head Injury  []? Sore Throat  []? Ear Pain  []? Dizziness []? Hearing Loss   [x]? Denies all unless marked  Musculoskeletal: []? Arthralgia  []? Myalgias []? Muscle cramps  []? Muscle twitches   [x]? Denies all unless marked   Spine:  []? Neck pain  []? Back pain  []? Sciaticia  [x]? Denies all unless marked  Neurological:[]? Visual Disturbance []? Double Vision []? Slurred Speech []? Trouble swallowing  []? Vertigo []? Tingling []? Numbness []? Weakness []? Loss of Balance   []? Loss of Consciousness []? Memory Loss []? Seizures  [x]? Denies all unless marked  Psychiatric/Behavioral:[]? Depression []? Anxiety  [x]? Denies all unless marked  Sleep: []? Insomnia []? Sleep Disturbance []? Snoring [x]? Restless Legs []? Daytime Sleepiness [x]? Sleep Apnea  []? Denies all unless marked    The MA has completed the ROS with the patient. I have reviewed it in its' entirety with the patient and agree with the documentation. PHYSICAL EXAM  /73 (Site: Right Upper Arm, Position: Sitting, Cuff Size: Medium Adult)   Pulse 63   Temp 97.8 °F (36.6 °C)   Ht 5' 1.6\" (1.565 m)   Wt 225 lb (102.1 kg)   SpO2 98%   Breastfeeding No   BMI 41.69 kg/m²      Constitutional   Alert in NAD, well developed, pleasant and cooperative with exam; body habitus obese as indicated by BMI  Neck circumference:  16.25 inches  HEENT- Conjunctiva normal.  No scars, masses, or lesions over external nose or ears, hearing intact, no neck masses noted, no jugular vein distension, no bruit  Cardiac- Regular rate and rhythm  Pulmonary- Clear to auscultation, good expansion, normal effort without use of accessory muscles  Musculoskeletal  No significant wasting of muscles noted, no bony deformities  Extremities - No clubbing, cyanosis or edema  Skin  Warm, dry, and intact.   No rash, erythema, or pallor  Psychiatric  Mood, affect, and behavior appear normal      Neurologic:  Extraocular movements are intact without nystagmus. PERRL. Visual fields are full to confrontation. Facial movements are symmetrical and normal.  Speech is precise. Extremity strength is normal in both uppers and lowers. Deep tendon reflexes are intact and symmetrical.  Rapid alternating movements are unimpaired. Finger-to-nose testing is performed well, without dysmetria. Gait is normal.    I reviewed the following studies:       []  :  Clinical laboratory test results     []  :  Radiology reports                    [x]  :  Review and summarization of medical records and/or obtain medical records        []  :  Previous/recent polysomnogram report(s)     []  :  [place] Sleepiness Scale       [x]  :  Compliance download: The auto CPAP is set at a pressure range of 10cm to 20cm. Compliance download shows that she uses device: 13% of the time;  percentage of days with usage >=4 hours: 13%. AHI: 6.8    Assessment:       ICD-10-CM    1. Obstructive sleep apnea  G47.33    2. Restless leg syndrome  G25.81    3. Difficulty with CPAP use  Z78.9           [x]  :  Stable-RLS     []  :  Improved                       []  :  Well controlled              []  :  Resolving     []  :  Resolved     [x]  :  Inadequately controlled-EDUARDA     []  :  Worsening     []  :  Additional workup planned        Plan:     No orders of the defined types were placed in this encounter. 1.   Previously or presently advised of the etiology,  pathophysiology, diagnosis, treatment options, and risks of untreated EDUARDA. Risks may include, but are not limited to  hypertension, coronary artery disease, atrial fibrillation, CHF, diabetes, stroke, weight gain, impaired cognition, daytime somnolence, and motor vehicle accidents. Advised to abstain from driving or operating heavy machinery when drowsy and the use of respiratory suppressants. Discussed diagnostic studies and potential treatment plan.  Will evaluate for clinical benefit and and compliance during a 30 day period within the preceding 90 days PRN. 2.  The following educational material has been included in this visit after visit summary for your review: EDUARDA/PAP guidelines/RLS-Discussed with the patient and all questions fully answered. 3.  Use CPAP  4. Replace the mask-[employer]  5.   Follow up in 1 month for compliance evaluation

## 2022-12-02 ENCOUNTER — ANCILLARY PROCEDURE (OUTPATIENT)
Dept: PRIMARY CARE CLINIC | Age: 47
End: 2022-12-02
Payer: COMMERCIAL

## 2022-12-02 ENCOUNTER — OFFICE VISIT (OUTPATIENT)
Dept: PRIMARY CARE CLINIC | Age: 47
End: 2022-12-02

## 2022-12-02 VITALS
SYSTOLIC BLOOD PRESSURE: 130 MMHG | RESPIRATION RATE: 16 BRPM | WEIGHT: 263.2 LBS | BODY MASS INDEX: 48.44 KG/M2 | DIASTOLIC BLOOD PRESSURE: 78 MMHG | TEMPERATURE: 97.8 F | HEIGHT: 62 IN | HEART RATE: 76 BPM | OXYGEN SATURATION: 98 %

## 2022-12-02 DIAGNOSIS — Z79.4 TYPE 2 DIABETES MELLITUS WITHOUT COMPLICATION, WITH LONG-TERM CURRENT USE OF INSULIN (HCC): ICD-10-CM

## 2022-12-02 DIAGNOSIS — E11.9 TYPE 2 DIABETES MELLITUS WITHOUT COMPLICATION, WITH LONG-TERM CURRENT USE OF INSULIN (HCC): ICD-10-CM
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
## 2022-12-02 DIAGNOSIS — Z11.59 NEED FOR HEPATITIS C SCREENING TEST: ICD-10-CM
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
## 2022-12-02 LAB
ALBUMIN SERPL-MCNC: 4.2 G/DL (ref 3.5–5.2)
ALP BLD-CCNC: 90 U/L (ref 35–104)
ALT SERPL-CCNC: 17 U/L (ref 5–33)
ANION GAP SERPL CALCULATED.3IONS-SCNC: 9 MMOL/L (ref 7–19)
AST SERPL-CCNC: 12 U/L (ref 5–32)
BASOPHILS ABSOLUTE: 0.1 K/UL (ref 0–0.2)
BASOPHILS RELATIVE PERCENT: 0.5 % (ref 0–1)
BILIRUB SERPL-MCNC: 0.3 MG/DL (ref 0.2–1.2)
BUN BLDV-MCNC: 21 MG/DL (ref 6–20)
CALCIUM SERPL-MCNC: 9.4 MG/DL (ref 8.6–10)
CHLORIDE BLD-SCNC: 102 MMOL/L (ref 98–111)
CHOLESTEROL, TOTAL: 227 MG/DL (ref 160–199)
CO2: 26 MMOL/L (ref 22–29)
CREAT SERPL-MCNC: 0.8 MG/DL (ref 0.5–0.9)
CREATININE URINE: 166.5 MG/DL (ref 4.2–622)
EOSINOPHILS ABSOLUTE: 0.1 K/UL (ref 0–0.6)
EOSINOPHILS RELATIVE PERCENT: 0.7 % (ref 0–5)
GFR SERPL CREATININE-BSD FRML MDRD: >60 ML/MIN/{1.73_M2}
GLUCOSE BLD-MCNC: 179 MG/DL (ref 74–109)
HBA1C MFR BLD: 8 % (ref 4–6)
HCT VFR BLD CALC: 43.9 % (ref 37–47)
HDLC SERPL-MCNC: 49 MG/DL (ref 65–121)
HEMOGLOBIN: 14.6 G/DL (ref 12–16)
HEPATITIS C ANTIBODY INTERPRETATION: NORMAL
IMMATURE GRANULOCYTES #: 0 K/UL
LDL CHOLESTEROL CALCULATED: 141 MG/DL
LYMPHOCYTES ABSOLUTE: 2.7 K/UL (ref 1.1–4.5)
LYMPHOCYTES RELATIVE PERCENT: 24.7 % (ref 20–40)
MCH RBC QN AUTO: 29.6 PG (ref 27–31)
MCHC RBC AUTO-ENTMCNC: 33.3 G/DL (ref 33–37)
MCV RBC AUTO: 88.9 FL (ref 81–99)
MICROALBUMIN UR-MCNC: <1.2 MG/DL (ref 0–19)
MICROALBUMIN/CREAT UR-RTO: NORMAL MG/G
MONOCYTES ABSOLUTE: 0.5 K/UL (ref 0–0.9)
MONOCYTES RELATIVE PERCENT: 4.3 % (ref 0–10)
NEUTROPHILS ABSOLUTE: 7.6 K/UL (ref 1.5–7.5)
NEUTROPHILS RELATIVE PERCENT: 69.5 % (ref 50–65)
PDW BLD-RTO: 12.4 % (ref 11.5–14.5)
PLATELET # BLD: 269 K/UL (ref 130–400)
PMV BLD AUTO: 10.6 FL (ref 9.4–12.3)
POTASSIUM SERPL-SCNC: 4.3 MMOL/L (ref 3.5–5)
RBC # BLD: 4.94 M/UL (ref 4.2–5.4)
SODIUM BLD-SCNC: 137 MMOL/L (ref 136–145)
TOTAL PROTEIN: 6.5 G/DL (ref 6.6–8.7)
TRIGL SERPL-MCNC: 185 MG/DL (ref 0–149)
WBC # BLD: 10.9 K/UL (ref 4.8–10.8)

## 2022-12-02 PROCEDURE — 73030 X-RAY EXAM OF SHOULDER: CPT | Performed by: FAMILY MEDICINE

## 2022-12-02 RX ORDER — MELOXICAM 7.5 MG/1
7.5 TABLET ORAL DAILY PRN
Qty: 30 TABLET | Refills: 0 | Status: SHIPPED | OUTPATIENT
Start: 2022-12-02

## 2022-12-02 ASSESSMENT — ENCOUNTER SYMPTOMS
BLOOD IN STOOL: 0
SHORTNESS OF BREATH: 0
ABDOMINAL PAIN: 0
WHEEZING: 0
CHEST TIGHTNESS: 0

## 2022-12-02 NOTE — PROGRESS NOTES
Jennifer Mondragon (:  1975) is a 56 y.o. female,Established patient, here for evaluation of the following chief complaint(s):  New Patient and Shoulder Pain (Tripped and fell a few weeks ago, injury to right shoulder, getting worse instead of better. Feels like its down into her elbow. Pain with ROM)         ASSESSMENT/PLAN:  1. Fall, initial encounter  -     XR SHOULDER RIGHT (MIN 2 VIEWS); Future  2. Need for hepatitis C screening test  -     Hepatitis C Antibody; Future  3. Type 2 diabetes mellitus without complication, with long-term current use of insulin (HCC)  -     Hemoglobin A1C; Future  -     Comprehensive Metabolic Panel; Future  -     CBC with Auto Differential; Future  -     Microalbumin / Creatinine Urine Ratio; Future  -     Lipid Panel; Future    I personally reviewed patient's shoulder x-ray without any obvious fractures. Will pend final read to radiology and manage accordingly. We will treat current pain with meloxicam.  Patient instructed not to take this with any other NSAIDs and to stop use if she starts to experience any abdominal pain. I have suspicion patient may have small tear in the supraspinatus tendon and we may need to do a MRI to further evaluate for this. Given history of diabetes that has remained untreated for several months it is of dire importance that we get this under control. We will have patient return for diabetes visit on Monday to go over this in detail and to reinitiate medicines. I discussed with patient the importance of good diabetes control and the long-term ramifications of poor control    Return in about 3 days (around 2022). Subjective   SUBJECTIVE/OBJECTIVE:  Jennifer Mondragon is a 56 y.o. female who presents to establish care and due to shoulder pain. Patient states that she fell 4 weeks ago and has had ongoing pain since that time. Patient says she never saw anyone immediately after that occurrence.   Patient says she slid and fell on the elbow when she landed. Patient denies any head trauma. Patient is continuing to have pain in the elbow and has been using Biofreeze and Salonpas with no relief. Patient is a type II diabetic and notes that she has not been on any treatment for several months. Patient's last A1c was 11.4        Shoulder Pain   The pain is present in the right shoulder. This is a new problem. The current episode started in the past 7 days. There has been a history of trauma. The problem occurs constantly. The problem has been gradually worsening. The quality of the pain is described as aching. The pain is at a severity of 5/10. Associated symptoms include joint swelling and tingling. Pertinent negatives include no fever or joint locking. The symptoms are aggravated by activity. She has tried OTC pain meds and movement for the symptoms. There is no history of diabetes. Review of Systems   Constitutional:  Negative for activity change and fever. HENT:  Negative for congestion. Eyes:  Negative for visual disturbance. Respiratory:  Negative for chest tightness, shortness of breath and wheezing. Cardiovascular:  Negative for chest pain. Gastrointestinal:  Negative for abdominal pain and blood in stool. Genitourinary:  Negative for difficulty urinating and urgency. Neurological:  Positive for tingling. Negative for weakness and headaches. Psychiatric/Behavioral:  Negative for confusion. Objective   Physical Exam  Vitals reviewed. Constitutional:       General: She is not in acute distress. Appearance: Normal appearance. She is obese. She is not ill-appearing. HENT:      Head: Normocephalic and atraumatic. Cardiovascular:      Rate and Rhythm: Normal rate and regular rhythm. Pulses: Normal pulses. Heart sounds: Normal heart sounds. No murmur heard. Pulmonary:      Effort: Pulmonary effort is normal. No respiratory distress. Breath sounds: Normal breath sounds.  No wheezing or rhonchi. Chest:      Chest wall: No tenderness. Abdominal:      General: Abdomen is flat. Bowel sounds are normal. There is no distension. Palpations: Abdomen is soft. Tenderness: There is no abdominal tenderness. Musculoskeletal:         General: No swelling. Comments: Significant weakness on empty can test, Jf liftoff negative, speeds negative, mild tenderness overlying coracoid   Skin:     General: Skin is warm and dry. Neurological:      General: No focal deficit present. Mental Status: She is alert. Vitals:    12/02/22 1607   BP: 130/78   Pulse: 76   Resp: 16   Temp: 97.8 °F (36.6 °C)   SpO2: 98%        Current Outpatient Medications   Medication Sig Dispense Refill    meloxicam (MOBIC) 7.5 MG tablet Take 1 tablet by mouth daily as needed for Pain 30 tablet 0    Semaglutide,0.25 or 0.5MG/DOS, 2 MG/1.5ML SOPN 0.25 mg SC once weekly for 4 weeks then increase to 0.5 mg once weekly thereafter (Patient not taking: Reported on 12/2/2022) 4 pen 2    metFORMIN (GLUCOPHAGE) 1000 MG tablet Take 1 tablet by mouth 2 times daily (with meals) (Patient not taking: Reported on 6/4/2021) 180 tablet 0    atorvastatin (LIPITOR) 40 MG tablet Take 1 tablet by mouth daily (Patient not taking: Reported on 6/4/2021) 180 tablet 1    escitalopram (LEXAPRO) 20 MG tablet Take 1 tablet by mouth daily (Patient not taking: Reported on 6/4/2021) 90 tablet 1    tiZANidine (ZANAFLEX) 4 MG tablet Take 1 tablet by mouth every 8 hours as needed (neck spasm.) (Patient not taking: Reported on 12/2/2022) 40 tablet 2    blood glucose monitor kit and supplies Dispense sufficient amount for indicated testing frequency plus additional to accommodate PRN testing needs. Dispense all needed supplies to include: monitor, strips, lancing device, lancets, control solutions, alcohol swabs.  (Patient not taking: Reported on 12/2/2022) 1 kit 0    blood glucose monitor strips Test 1 times a day & as needed for symptoms of irregular blood glucose. Dispense sufficient amount for indicated testing frequency plus additional to accommodate PRN testing needs. (Patient not taking: Reported on 12/2/2022) 100 strip 3     No current facility-administered medications for this visit. Family History   Problem Relation Age of Onset    Colon Cancer Mother 54      Past Medical History:   Diagnosis Date    IUD (intrauterine device) in place 10/31/2019    Obesity affecting pregnancy in first trimester 5/16/2019    Ovarian cyst       Past Surgical History:   Procedure Laterality Date    DENTAL SURGERY      DILATION AND CURETTAGE      DILATION AND CURETTAGE OF UTERUS      DILATION AND CURETTAGE OF UTERUS N/A 10/30/2020    DILATATION AND CURETTAGE HYSTEROSCOPY ENDOMETRIAL ABLATION performed by Yue Ann MD at [facility] CYST SURGERY        No Known Allergies     Lab Results   Component Value Date     01/08/2021    K 4.5 01/08/2021     01/08/2021    CO2 26 01/08/2021    BUN 20 01/08/2021    CREATININE 0.8 01/08/2021    GLUCOSE 181 (H) 01/08/2021    CALCIUM 9.4 01/08/2021    PROT 6.8 01/08/2021    LABALBU 4.0 01/08/2021    BILITOT 0.3 01/08/2021    ALKPHOS 122 (H) 01/08/2021    AST 11 01/08/2021    ALT 16 01/08/2021    LABGLOM >60 01/08/2021    GFRAA >59 01/08/2021        Lab Results   Component Value Date    WBC 9.7 01/08/2021    HGB 14.7 01/08/2021    HCT 43.9 01/08/2021    MCV 87.6 01/08/2021     01/08/2021                EMR Dragon/transcription disclaimer:  Much of this encounter note is electronic transcription/translation of spoken language toprinted texts. The electronic translation of spoken language may be erroneous, or at times, nonsensical words or phrases may be inadvertently transcribed. Although I have reviewed the note for such errors, some may stillexist.      An electronic signature was used to authenticate this note.     --Ezekiel Silva MD

## 2022-12-05 ENCOUNTER — OFFICE VISIT (OUTPATIENT)
Dept: PRIMARY CARE CLINIC | Age: 47
End: 2022-12-05
Payer: COMMERCIAL

## 2022-12-05 VITALS
OXYGEN SATURATION: 98 % | WEIGHT: 266 LBS | HEART RATE: 70 BPM | DIASTOLIC BLOOD PRESSURE: 78 MMHG | SYSTOLIC BLOOD PRESSURE: 134 MMHG | HEIGHT: 62 IN | TEMPERATURE: 97.1 F | BODY MASS INDEX: 48.95 KG/M2

## 2022-12-05 DIAGNOSIS — Z79.4 TYPE 2 DIABETES MELLITUS WITHOUT COMPLICATION, WITH LONG-TERM CURRENT USE OF INSULIN (HCC): ICD-10-CM

## 2022-12-05 DIAGNOSIS — Z00.00 ANNUAL PHYSICAL EXAM: Primary | ICD-10-CM

## 2022-12-05 DIAGNOSIS — E11.9 TYPE 2 DIABETES MELLITUS WITHOUT COMPLICATION, WITH LONG-TERM CURRENT USE OF INSULIN (HCC): ICD-10-CM

## 2022-12-05 PROCEDURE — 99214 OFFICE O/P EST MOD 30 MIN: CPT | Performed by: FAMILY MEDICINE

## 2022-12-05 PROCEDURE — 3052F HG A1C>EQUAL 8.0%<EQUAL 9.0%: CPT | Performed by: FAMILY MEDICINE

## 2022-12-05 RX ORDER — SEMAGLUTIDE 1.34 MG/ML
0.5 INJECTION, SOLUTION SUBCUTANEOUS WEEKLY
Qty: 4 ADJUSTABLE DOSE PRE-FILLED PEN SYRINGE | Refills: 0 | Status: SHIPPED | OUTPATIENT
Start: 2022-12-05

## 2022-12-05 SDOH — ECONOMIC STABILITY: FOOD INSECURITY: WITHIN THE PAST 12 MONTHS, YOU WORRIED THAT YOUR FOOD WOULD RUN OUT BEFORE YOU GOT MONEY TO BUY MORE.: NEVER TRUE

## 2022-12-05 SDOH — ECONOMIC STABILITY: FOOD INSECURITY: WITHIN THE PAST 12 MONTHS, THE FOOD YOU BOUGHT JUST DIDN'T LAST AND YOU DIDN'T HAVE MONEY TO GET MORE.: NEVER TRUE

## 2022-12-05 ASSESSMENT — PATIENT HEALTH QUESTIONNAIRE - PHQ9
1. LITTLE INTEREST OR PLEASURE IN DOING THINGS: 0
SUM OF ALL RESPONSES TO PHQ QUESTIONS 1-9: 0
2. FEELING DOWN, DEPRESSED OR HOPELESS: 0
SUM OF ALL RESPONSES TO PHQ9 QUESTIONS 1 & 2: 0
SUM OF ALL RESPONSES TO PHQ QUESTIONS 1-9: 0

## 2022-12-05 ASSESSMENT — ENCOUNTER SYMPTOMS
WHEEZING: 0
BLOOD IN STOOL: 0
ABDOMINAL PAIN: 0
SHORTNESS OF BREATH: 0
CHEST TIGHTNESS: 0

## 2022-12-05 ASSESSMENT — SOCIAL DETERMINANTS OF HEALTH (SDOH): HOW HARD IS IT FOR YOU TO PAY FOR THE VERY BASICS LIKE FOOD, HOUSING, MEDICAL CARE, AND HEATING?: NOT HARD AT ALL

## 2022-12-05 NOTE — PROGRESS NOTES
Leopold Leech (:  1975) is a 56 y.o. female,Established patient, here for evaluation of the following chief complaint(s):  Diabetes (Discuss recent labs performed at our office. Patient is not taking any medications at this time.  ) and Otalgia (Right. Friday.  )    The 10-year ASCVD risk score (Sally ROBERTS, et al., 2019) is: 2.7%    Values used to calculate the score:      Age: 56 years      Sex: Female      Is Non- : No      Diabetic: Yes      Tobacco smoker: No      Systolic Blood Pressure: 436 mmHg      Is BP treated: No      HDL Cholesterol: 49 mg/dL      Total Cholesterol: 227 mg/dL        ASSESSMENT/PLAN:  1. Annual physical exam  [facility] [place]  2. Type 2 diabetes mellitus without complication, with long-term current use of insulin ([facility] [address]    Despite our previous discussion patient's blood sugar appears to be under better control than at previous measurement. I still believe patient would benefit from initiation of metformin and a GLP-1 at this time. Patient is agreeable to this and would prefer to stay down as low of a dose of metformin as possible given it has given her some GI discomfort in the past.  We will titrated up slowly. We will also initiate Ozempic. Pen use was demonstrated to patient and how we will pull plan to increase her dosage slowly every 4 weeks. Diabetic foot exam completed and negative for any loss of sensation. Referral to gastroenterology sent for screening colonoscopy  Patient counseled on use of Flonase for ear decongestion  Statin not indicated at this time and patient counseled on importance of diet for maintaining this. ASCVD score above    No follow-ups on file. Subjective   SUBJECTIVE/OBJECTIVE:  Leopold Leech is a 56 y.o. female who presents for diabetes follow-up.   Patient had her labs completed in the meantime with A1c of 8.0 patient says she would prefer to try and lose weight to control her diabetes and is not very keen on starting medications. Patient is agreeable however to doing one that may help her to lose weight. Patient has not seen a optometrist in over a year. Patient says she plans to get a gym membership and exercise regularly and attempting to lose weight. Patient notes that her mother  of colon cancer and would like to schedule a screening colonoscopy at this time            Diabetes  She presents for her follow-up diabetic visit. She has type 2 diabetes mellitus. The initial diagnosis of diabetes was made 2 years ago. Her disease course has been improving. Pertinent negatives for hypoglycemia include no confusion or headaches. Associated symptoms include foot paresthesias. Pertinent negatives for diabetes include no chest pain, no foot ulcerations and no weakness. Symptoms are stable. Pertinent negatives for diabetic complications include no retinopathy. Otalgia   Pertinent negatives include no abdominal pain or headaches. Review of Systems   Constitutional:  Negative for activity change and fever. HENT:  Positive for ear pain. Negative for congestion. Eyes:  Negative for visual disturbance. Respiratory:  Negative for chest tightness, shortness of breath and wheezing. Cardiovascular:  Negative for chest pain. Gastrointestinal:  Negative for abdominal pain and blood in stool. Genitourinary:  Negative for difficulty urinating and urgency. Neurological:  Negative for weakness and headaches. Psychiatric/Behavioral:  Negative for confusion. Objective   Physical Exam  Vitals reviewed. Constitutional:       General: She is not in acute distress. Appearance: Normal appearance. She is obese. She is not ill-appearing. HENT:      Head: Normocephalic and atraumatic.       Right Ear: Tympanic membrane, ear canal and external ear normal.      Left Ear: Tympanic membrane, ear canal and external ear normal.   Cardiovascular:      Rate and Rhythm: Normal rate and regular rhythm. Pulses: Normal pulses. Heart sounds: Normal heart sounds. No murmur heard. Pulmonary:      Effort: Pulmonary effort is normal. No respiratory distress. Breath sounds: Normal breath sounds. No wheezing or rhonchi. Chest:      Chest wall: No tenderness. Abdominal:      General: Abdomen is flat. Bowel sounds are normal. There is no distension. Palpations: Abdomen is soft. Tenderness: There is no abdominal tenderness. Musculoskeletal:         General: No swelling. Feet:      Right foot:      Protective Sensation: 9 sites tested. 9 sites sensed. Skin integrity: Skin integrity normal.      Left foot:      Protective Sensation: 9 sites tested. 9 sites sensed. Skin integrity: Skin integrity normal.   Skin:     General: Skin is warm and dry. Neurological:      General: No focal deficit present. Mental Status: She is alert.           Vitals:    12/05/22 1527   BP: 134/78   Pulse: 70   Temp: 97.1 °F (36.2 °C)   SpO2: 98%        Current Outpatient Medications   Medication Sig Dispense Refill    Semaglutide,0.25 or 0.5MG/DOS, (OZEMPIC, 0.25 OR 0.5 MG/DOSE,) 2 MG/1.5ML SOPN Inject 0.5 mg into the skin once a week 4 Adjustable Dose Pre-filled Pen Syringe 0    metFORMIN (GLUCOPHAGE) 500 MG tablet Take 1 tablet by mouth daily (with breakfast) 180 tablet 1    meloxicam (MOBIC) 7.5 MG tablet Take 1 tablet by mouth daily as needed for Pain (Patient not taking: Reported on 12/5/2022) 30 tablet 0    Semaglutide,0.25 or 0.5MG/DOS, 2 MG/1.5ML SOPN 0.25 mg SC once weekly for 4 weeks then increase to 0.5 mg once weekly thereafter (Patient not taking: Reported on 12/2/2022) 4 pen 2    atorvastatin (LIPITOR) 40 MG tablet Take 1 tablet by mouth daily (Patient not taking: Reported on 6/4/2021) 180 tablet 1    escitalopram (LEXAPRO) 20 MG tablet Take 1 tablet by mouth daily (Patient not taking: Reported on 6/4/2021) 90 tablet 1    tiZANidine (ZANAFLEX) 4 MG tablet Take 1 tablet by mouth every 8 hours as needed (neck spasm.) (Patient not taking: Reported on 12/2/2022) 40 tablet 2    blood glucose monitor kit and supplies Dispense sufficient amount for indicated testing frequency plus additional to accommodate PRN testing needs. Dispense all needed supplies to include: monitor, strips, lancing device, lancets, control solutions, alcohol swabs. (Patient not taking: Reported on 12/2/2022) 1 kit 0    blood glucose monitor strips Test 1 times a day & as needed for symptoms of irregular blood glucose. Dispense sufficient amount for indicated testing frequency plus additional to accommodate PRN testing needs. (Patient not taking: Reported on 12/2/2022) 100 strip 3     No current facility-administered medications for this visit.       Family History   Problem Relation Age of Onset    Colon Cancer Mother 54      Past Medical History:   Diagnosis Date    IUD (intrauterine device) in place 10/31/2019    Obesity affecting pregnancy in first trimester 5/16/2019    Ovarian cyst       Past Surgical History:   Procedure Laterality Date    DENTAL SURGERY      DILATION AND CURETTAGE      DILATION AND CURETTAGE OF UTERUS      DILATION AND CURETTAGE OF UTERUS N/A 10/30/2020    DILATATION AND CURETTAGE HYSTEROSCOPY ENDOMETRIAL ABLATION performed by Cheyanne Thornton MD at [facility]        No Known Allergies     Lab Results   Component Value Date     12/02/2022    K 4.3 12/02/2022     12/02/2022    CO2 26 12/02/2022    BUN 21 (H) 12/02/2022    CREATININE 0.8 12/02/2022    GLUCOSE 179 (H) 12/02/2022    CALCIUM 9.4 12/02/2022    PROT 6.5 (L) 12/02/2022    LABALBU 4.2 12/02/2022    BILITOT 0.3 12/02/2022    ALKPHOS 90 12/02/2022    AST 12 12/02/2022    ALT 17 12/02/2022    LABGLOM >60 12/02/2022    GFRAA >59 01/08/2021        Lab Results   Component Value Date    WBC 10.9 (H) 12/02/2022    HGB 14.6 12/02/2022    HCT 43.9 12/02/2022    MCV 88.9 12/02/2022     12/02/2022 EMR Dragon/transcription disclaimer:  Much of this encounter note is electronic transcription/translation of spoken language toprinted texts. The electronic translation of spoken language may be erroneous, or at times, nonsensical words or phrases may be inadvertently transcribed. Although I have reviewed the note for such errors, some may stillexist.      An electronic signature was used to authenticate this note.     --Drake Swain MD

## 2022-12-12 ENCOUNTER — OFFICE VISIT (OUTPATIENT)
Dept: PRIMARY CARE CLINIC | Age: 47
End: 2022-12-12
Payer: COMMERCIAL

## 2022-12-12 VITALS
RESPIRATION RATE: 18 BRPM | OXYGEN SATURATION: 97 % | WEIGHT: 259.6 LBS | HEIGHT: 62 IN | TEMPERATURE: 97 F | DIASTOLIC BLOOD PRESSURE: 94 MMHG | SYSTOLIC BLOOD PRESSURE: 152 MMHG | BODY MASS INDEX: 47.77 KG/M2 | HEART RATE: 71 BPM

## 2022-12-12 DIAGNOSIS — Z12.11 SCREENING FOR COLON CANCER: ICD-10-CM

## 2022-12-12 DIAGNOSIS — M25.511 CHRONIC RIGHT SHOULDER PAIN: Primary | ICD-10-CM

## 2022-12-12 DIAGNOSIS — M75.41 IMPINGEMENT SYNDROME OF RIGHT SHOULDER: ICD-10-CM

## 2022-12-12 DIAGNOSIS — G89.29 CHRONIC RIGHT SHOULDER PAIN: Primary | ICD-10-CM

## 2022-12-12 DIAGNOSIS — Z00.00 ANNUAL PHYSICAL EXAM: ICD-10-CM

## 2022-12-12 PROCEDURE — 99213 OFFICE O/P EST LOW 20 MIN: CPT | Performed by: FAMILY MEDICINE

## 2022-12-12 RX ORDER — MELOXICAM 7.5 MG/1
7.5 TABLET ORAL DAILY
COMMUNITY

## 2022-12-12 ASSESSMENT — ENCOUNTER SYMPTOMS
SHORTNESS OF BREATH: 0
ABDOMINAL PAIN: 0
WHEEZING: 0
CHEST TIGHTNESS: 0
BLOOD IN STOOL: 0

## 2022-12-12 NOTE — PROGRESS NOTES
Joshua Torres (:  1975) is a 56 y.o. female,Established patient, here for evaluation of the following chief complaint(s):  Pharyngitis (Pt is here for a sore throat. Pt states she thinks she has a sinus infection. Pt has a sore inside her right nostril that won't go away.) and Shoulder Pain (Pt states her right shoulder is still hurting and thinks we should go ahead with the MRI)         ASSESSMENT/PLAN:  1. Chronic right shoulder pain  2. Impingement syndrome of right shoulder  -     [facility]  3. Annual physical exam  4. Screening for colon cancer  -     [facility] [place]    Referral for screening colonoscopy sent at this time. Patient's physical exam most consistent with shoulder impingement. Recommended patient continue utilizing meloxicam and will refer her to PT for therapy. Discussed with patient that we will likely take 6 to 8 weeks of therapy to see improvement. If no improvement following this we will likely seek MRI. No follow-ups on file. Subjective   SUBJECTIVE/OBJECTIVE:  Joshua Torres is a 56 y.o. female who presents due to ongoing right shoulder pain. We completed an x-ray at last visit which was negative for any abnormality. Patient has been taking Doxy cam and states that it typically will help control her pain through the day however it typically worsens in the evening when she is fixing to go to bed. Patient says that she is not experienced any weakness but says it is more difficult to lift heavier objects as it does exacerbate her pain. Patient says that her pain does not seem to be improving much with home exercises. Of note patient says she was never called about her colonoscopy to schedule it. Patient is still interested in doing this if possible. Review of Systems   Constitutional:  Negative for activity change and fever. HENT:  Negative for congestion. Eyes:  Negative for visual disturbance.    Respiratory: Negative for chest tightness, shortness of breath and wheezing. Cardiovascular:  Negative for chest pain. Gastrointestinal:  Negative for abdominal pain and blood in stool. Genitourinary:  Negative for difficulty urinating and urgency. Musculoskeletal:  Positive for arthralgias. Negative for joint swelling. Neurological:  Negative for tremors, weakness, numbness and headaches. Psychiatric/Behavioral:  Negative for confusion. Objective   Physical Exam  Vitals reviewed. Constitutional:       General: She is not in acute distress. Appearance: Normal appearance. She is not ill-appearing. HENT:      Head: Normocephalic and atraumatic. Cardiovascular:      Rate and Rhythm: Normal rate and regular rhythm. Pulses: Normal pulses. Heart sounds: Normal heart sounds. No murmur heard. Pulmonary:      Effort: Pulmonary effort is normal. No respiratory distress. Breath sounds: Normal breath sounds. No wheezing or rhonchi. Chest:      Chest wall: No tenderness. Abdominal:      General: Abdomen is flat. Bowel sounds are normal. There is no distension. Palpations: Abdomen is soft. Tenderness: There is no abdominal tenderness. Musculoskeletal:         General: No swelling. Comments: Negative right empty can test, negative speeds test, negative Jf liftoff, positive scarf sign, pain with palpation over the scapular notch   Skin:     General: Skin is warm and dry. Neurological:      General: No focal deficit present. Mental Status: She is alert.           Vitals:    12/12/22 1042   BP: (!) 152/94   Pulse: 71   Resp: 18   Temp: 97 °F (36.1 °C)   SpO2: 97%        Current Outpatient Medications   Medication Sig Dispense Refill    meloxicam (MOBIC) 7.5 MG tablet Take 7.5 mg by mouth daily      Semaglutide,0.25 or 0.5MG/DOS, (OZEMPIC, 0.25 OR 0.5 MG/DOSE,) 2 MG/1.5ML SOPN Inject 0.5 mg into the skin once a week 4 Adjustable Dose Pre-filled Pen Syringe 0    metFORMIN (GLUCOPHAGE) 500 MG tablet Take 1 tablet by mouth daily (with breakfast) 180 tablet 1     No current facility-administered medications for this visit. Family History   Problem Relation Age of Onset    Colon Cancer Mother 54      Past Medical History:   Diagnosis Date    IUD (intrauterine device) in place 10/31/2019    Obesity affecting pregnancy in first trimester 5/16/2019    Ovarian cyst       Past Surgical History:   Procedure Laterality Date    DENTAL SURGERY      DILATION AND CURETTAGE      DILATION AND CURETTAGE OF UTERUS      DILATION AND CURETTAGE OF UTERUS N/A 10/30/2020    DILATATION AND CURETTAGE HYSTEROSCOPY ENDOMETRIAL ABLATION performed by Tommie Bamberger, [facility] at [facility]        No Known Allergies     Lab Results   Component Value Date     12/02/2022    K 4.3 12/02/2022     12/02/2022    CO2 26 12/02/2022    BUN 21 (H) 12/02/2022    CREATININE 0.8 12/02/2022    GLUCOSE 179 (H) 12/02/2022    CALCIUM 9.4 12/02/2022    PROT 6.5 (L) 12/02/2022    LABALBU 4.2 12/02/2022    BILITOT 0.3 12/02/2022    ALKPHOS 90 12/02/2022    AST 12 12/02/2022    ALT 17 12/02/2022    LABGLOM >60 12/02/2022    GFRAA >59 01/08/2021        Lab Results   Component Value Date    WBC 10.9 (H) 12/02/2022    HGB 14.6 12/02/2022    HCT 43.9 12/02/2022    MCV 88.9 12/02/2022     12/02/2022                EMR Dragon/transcription disclaimer:  Much of this encounter note is electronic transcription/translation of spoken language toprinted texts. The electronic translation of spoken language may be erroneous, or at times, nonsensical words or phrases may be inadvertently transcribed. Although I have reviewed the note for such errors, some may stillexist.      An electronic signature was used to authenticate this note.     --Karena Wong MD

## 2022-12-20 ENCOUNTER — HOSPITAL ENCOUNTER (OUTPATIENT)
Dept: PHYSICAL THERAPY | Age: 47
Setting detail: THERAPIES SERIES
Discharge: HOME | End: 2022-12-20
Payer: COMMERCIAL

## 2022-12-20 PROCEDURE — 97110 THERAPEUTIC EXERCISES: CPT

## 2022-12-20 PROCEDURE — 97162 PT EVAL MOD COMPLEX 30 MIN: CPT

## 2022-12-20 ASSESSMENT — PAIN DESCRIPTION - FREQUENCY: FREQUENCY: CONTINUOUS

## 2022-12-20 ASSESSMENT — PAIN DESCRIPTION - LOCATION: LOCATION: SHOULDER;ARM

## 2022-12-20 ASSESSMENT — PAIN DESCRIPTION - PAIN TYPE: TYPE: ACUTE PAIN

## 2022-12-20 ASSESSMENT — PAIN DESCRIPTION - ORIENTATION: ORIENTATION: RIGHT

## 2022-12-21 ASSESSMENT — PAIN DESCRIPTION - PAIN TYPE: TYPE: ACUTE PAIN

## 2022-12-21 ASSESSMENT — PAIN DESCRIPTION - DESCRIPTORS: DESCRIPTORS: SHARP;TIGHTNESS;CRAMPING

## 2022-12-21 ASSESSMENT — PAIN DESCRIPTION - FREQUENCY: FREQUENCY: CONTINUOUS

## 2022-12-21 ASSESSMENT — PAIN DESCRIPTION - ORIENTATION: ORIENTATION: RIGHT

## 2022-12-21 ASSESSMENT — PAIN DESCRIPTION - LOCATION: LOCATION: SHOULDER;ARM

## 2022-12-22 NOTE — PROGRESS NOTES
Physical Therapy: Initial Evaluation    Patient: Olga Lidia Anthony (84 y.o. female)   Examination Date:   Plan of Care Certification Period: 2022 to 23      :  1975 ;    MRN: [id number]  CSN: [id number]   Insurance: Payor: Heron Kocher / Plan: [address] [place] [address] / Product Type: *No Product type* /   Insurance ID: [id number] - ([facility]) Secondary Insurance (if applicable):    Referring Physician: MD Yumiko Timmons MD   PCP: Yumiko Grant MD Visits to Date/Visits Approved:     No Show/Cancelled Appts:   /       Medical Diagnosis: Impingement syndrome of right shoulder [M75.41] R shoulder pain  Treatment Diagnosis: R shoulder pain     PERTINENT MEDICAL HISTORY   Patient Assessed for Rehabilitation Services: Yes       Medical History: Chart Reviewed: Yes   Past Medical History:   Diagnosis Date    IUD (intrauterine device) in place 10/31/2019    Obesity affecting pregnancy in first trimester 2019    Ovarian cyst      Surgical History:   Past Surgical History:   Procedure Laterality Date    [address] OF UTERUS N/A 10/30/2020    DILATATION AND CURETTAGE HYSTEROSCOPY ENDOMETRIAL ABLATION performed by Leon Alexander MD at [facility] CYST SURGERY         Medications:   Current Outpatient Medications:     meloxicam (MOBIC) 7.5 MG tablet, Take 7.5 mg by mouth daily, Disp: , Rfl:     Semaglutide,0.25 or 0.5MG/DOS, (OZEMPIC, 0.25 OR 0.5 MG/DOSE,) 2 MG/1.5ML SOPN, Inject 0.5 mg into the skin once a week, Disp: 4 Adjustable Dose Pre-filled Pen Syringe, Rfl: 0    metFORMIN (GLUCOPHAGE) 500 MG tablet, Take 1 tablet by mouth daily (with breakfast), Disp: 180 tablet, Rfl: 1  Allergies: Patient has no known allergies.       SUBJECTIVE EXAMINATION     History obtained from[de-identified] Chart Review, Patient,      Family/Caregiver Present: No    Subjective History:    Subjective: Pt presents with complaint of R shoulder pain that has been present since a fall 6 weeks ago. She was jogging across a parking lot, became tangled in a piece of clothing, and fell forward, landing on outstretched R arm. Xrays were unremarkable. She reports pain with motion, especially any reaching or rotation. At times it radiates up into neck and into shoulder blade area. She is unable to lay on RUE. Has difficulty lifting items, stating her arm shakes while lifting coffee cup. 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 Pain is relieved by rest.  Was prescribed meloxicam and has been taking consistently x 2 weeks, but notes no change. Additional Pertinent Hx (if applicable): 55 y/o F presents with complaint of R shoulder pain. Prior diagnostic testing[de-identified] X-ray  Previous treatments prior to current episode?: Medications      Learning/Language:       Pain Screening   Pain Screening  Patient Currently in Pain: Yes  Pain Assessment: 0-10  Best Pain Level: 5  Worst Pain Level: 8  Pain Type: Acute pain  Pain Location: Shoulder, Arm  Pain Orientation: Right  Pain Descriptors: Sharp, Tightness, Cramping  Pain Frequency: Continuous  Aggravating factors: Movement, Reaching, Lifting, Carrying, Laying on involved side, Work duties  Pain Management/Relieving Factors: Rest    Functional Status       Prior Level of Function:    Independent      Current Level of Function:         ADL Assistance: Independent  Active : Yes    OBJECTIVE EXAMINATION     Review of Systems:  Vision: Within Functional Limits  Hearing: Within functional limits  Overall Orientation Status: Within Normal Limits  Patient affect[de-identified] Normal  Follows Commands: Within Functional Limits    Observations:  General Observations  Description: Guarded movement of RUE, holding elbow bent to 90 degrees and resting on stomach. Exaggerated upper trap activation on R.     Palpation:   Right Shoulder Palpation: TTP at R bicipital groove and throughout R biceps muscle belly. No TTP in posterior shoulder/scapular area. Left AROM  Right AROM      General AROM UE: Left WNL, Right WFL, Other (comment) (R WFL but painful at end range)    General AROM UE: Left WNL, Right WFL, Other (comment) (R WFL but painful at end range)        Left PROM  Right PROM      General PROM UE: Left WNL, Right WNL (Improve ROM and min to no pain with PROM of RUE)    General PROM UE: Left WNL, Right WNL (Improve ROM and min to no pain with PROM of RUE)        Left Strength  Right Strength      General Strength Testing UE: Other (comment) (Forearm/wrist 5/5,  equal and WNL)  Strength LUE  L Shoulder Flexion: 5/5  L Shoulder Extension: 5/5  L Shoulder ABduction: 5/5  L Shoulder Internal Rotation: 5/5  L Shoulder External Rotation: 5/5  L Elbow Flexion: 5/5  L Elbow Extension: 5/5 General Strength Testing UE: Other (comment) (Forearm/wrist 5/5,  equal and WNL)  Strength RUE  R Shoulder Flexion: 4-/5, 4/5  R Shoulder Extension: 5/5  R Shoulder ABduction: 4+/5  R Shoulder Internal Rotation: 5/5  R Shoulder External Rotation: 4-/5  R Elbow Flexion: 4/5  R Elbow Extension: 5/5     Special Tests:   Impingement Tests  Monisha Sanchez Test: R (+)  Lift-Off Test: R (-)  Belly Press Test: R (-)  Empty Can: R (-)  Full Can: R (+)     ASSESSMENT     Impression: Assessment: Pt presents with complaint of R shoulder pain and demonstrates decreased strength, ROM, and postural deviations. Will benefit from skilled PT intervention to address listed impairments.     Body Structures, Functions, Activity Limitations Requiring Skilled Therapeutic Intervention: Decreased ADL status, Decreased ROM, Decreased tolerance to work activity, Decreased strength, Decreased high-level IADLs, Decreased posture, Increased pain    Statement of Medical Necessity: Physical Therapy is both indicated and medically necessary as outlined in the POC to increase the likelihood of meeting the functionally related goals stated below. Patient's Activity Tolerance: Patient tolerated evaluation without incident      Patient's rehabilitation potential/prognosis is considered to be: Good    Factors which may impact rehabilitation potential include:          GOALS   Patient Goal(s): reduce R arm pain  Short Term Goals Completed by 3-4 weeks Goal Status   Independent with HEP New   Demo full, pain-free AROM of RUE New   Improve RUE strength to 5/5 New                                                 Long Term Goals Completed by 4-6 weeks Goal Status   Report ability to style her hair with pain no greater than 2/10 New   Report improved tolerance to sleeping on R side New   Improve QuickDASH score to 20% impairment or better                                                    TREATMENT PLAN       Requires PT Follow-Up: Yes  Treatment Initiated : Initiated pulleys and pendulums with good tolerance, then issued for home use.     Pt. actively involved in establishing Plan of Care and Goals: Yes  Patient/ Caregiver education and instruction: Goals, PT Role, Plan of Care, Evaluative findings, Insurance, Home Exercise Program, Anatomy of condition             Treatment may include any combination of the following: Current Treatment Recommendations: Strengthening, ROM, Manual, Pain management, Home exercise program, Positioning, Modalities, Equipment evaluation, education, & procurement, Patient/Caregiver education & training, Safety education & training, Therapeutic activities  Modalities: E-stim - unattended, Heat/Cold     Frequency / Duration:  Patient to be seen 2x for 4-6 weeks weeks      Eval Complexity: Overall Evaluation : Medium  Decision Making: Medium Complexity    PT Treatment Completed:  Exercises:      Treatment Reasoning    Exercise 1: Pulleys x 2'  Exercise 2: RUE pendulum (circles, fwd/back, side to side) x 5 ea  Exercise 3: Wall walk  Exercise 4: Isometric R shoulder flex/ext/abd/ER/IR  Exercise 5: Corner stretch  Exercise 6: Pec stretch on doorway  Exercise 7: Slouch with overcorrection  Exercise 8: R upper trap/levator stretch  Exercise 9: 3 way elbow flexion  Exercise 10: Triceps extension with t-band  Exercise 11: Pronation/supination with offset weight  Exercise 12: Supine cane flex/ER/abd  Exercise 13: Supine benchpress/shoulder protraction  Exercise 14: PROM/AAROM R shoulder flex/abd/ER/IR  Exercise 15: Standing Is, Ts  Exercise 16: Scapular retraction with t-band  Exercise 17: Tband shoulder flex/ext/abd/ER/IR  Exercise 18: IASTM/STM to R neck/shoulder  Exercise 19: IFC + MH PRN to R neck/shoulder for elevated pain                        Therapist Signature: Dev Pollack, PT    Date: 12/21/2022

## 2022-12-28 ENCOUNTER — HOSPITAL ENCOUNTER (OUTPATIENT)
Dept: PHYSICAL THERAPY | Age: 47
Setting detail: THERAPIES SERIES
Discharge: HOME | End: 2022-12-28
Payer: COMMERCIAL

## 2022-12-28 PROCEDURE — 97110 THERAPEUTIC EXERCISES: CPT

## 2022-12-28 PROCEDURE — 97140 MANUAL THERAPY 1/> REGIONS: CPT

## 2022-12-28 RX ORDER — SEMAGLUTIDE 1.34 MG/ML
0.5 INJECTION, SOLUTION SUBCUTANEOUS WEEKLY
Qty: 12 ADJUSTABLE DOSE PRE-FILLED PEN SYRINGE | Refills: 1 | OUTPATIENT
Start: 2022-12-28

## 2022-12-28 RX ORDER — SEMAGLUTIDE 1.34 MG/ML
1 INJECTION, SOLUTION SUBCUTANEOUS WEEKLY
Qty: 12 ML | Refills: 2 | Status: SHIPPED | OUTPATIENT
Start: 2022-12-28

## 2022-12-28 ASSESSMENT — PAIN DESCRIPTION - ORIENTATION: ORIENTATION: RIGHT

## 2022-12-28 ASSESSMENT — PAIN DESCRIPTION - LOCATION: LOCATION: SHOULDER;ARM

## 2022-12-28 ASSESSMENT — PAIN DESCRIPTION - PAIN TYPE: TYPE: CHRONIC PAIN

## 2022-12-28 NOTE — PROGRESS NOTES
Physical Therapy  Daily Treatment Note  Date: 2022  Patient Name: Janet Diallo  MRN: [id number]     :   1975    Referring Physician: MD Cheyenne Ramos MD   PCP: Cheyenne Lorenzana MD    Medical Diagnosis: Impingement syndrome of right shoulder [M75.41] R shoulder pain  Treatment Diagnosis: R shoulder pain      Insurance: Payor: [facility] / Plan: [address] [place] [address] / Product Type: *No Product type* /   Insurance ID: [id number] - ([facility])    Subjective:   General  Diagnosis: R shoulder pain  Referring Provider (secondary): Cheyenne Lorenzana MD  PT Insurance Information: [facility] (No precert req'd.   90 visits per yr PT/OT/ST, 0 used)  Total # of Visits Approved: 12  Total # of Visits to Date: 2  Plan of Care/Certification Expiration Date: 23  Progress Note Due Date: 23  Referring Provider (secondary): Cheyenne Lorenzana MD  Subjective: right now the pain isn't too bad, certain ways i move or pull makes it worse  Patient Currently in Pain: Yes  Best Pain Level: 3  Pain Type: Chronic pain  Pain Location: Shoulder, Arm  Pain Orientation: Right       Treatment Activities:  Exercises:      Treatment Reasoning    Exercise 1: Pulleys x 2'  Exercise 2: RUE pendulum (circles, fwd/back, side to side) x 5 ea  Exercise 3: Wall walk x 5  Exercise 4: Isometric R shoulder flex/ext/abd/ER/IR  x 10  Exercise 5: Pec stretch on doorway  10\" x 3  Exercise 6: Corner stretch  10\" x 3  Exercise 7: Slouch with overcorrection  x 10  Exercise 8: R upper trap/levator stretch  10\" x 3 ea  Exercise 9: 3 way elbow flexion  2#  10/10/10  Exercise 10: Triceps extension with t-band  Exercise 11: Pronation/supination with offset weight  1#  x 10  Exercise 12: Supine cane flex/ER/abd  x 10  Exercise 13: Supine benchpress/shoulder protraction  x 10  Exercise 14: PROM/AAROM R shoulder flex/abd/ER/IR  x 10 no abd  Exercise 15: Standing Is, Ts  x 5  Exercise 16: Scapular retraction with t-band--not today  Exercise 17: Tband shoulder flex/ext/abd/ER/IR--not today  Exercise 18: IASTM/STM to R neck/shoulder   STM x 8'  (5' of that TFM to biceps tendon)  Exercise 19: IFC + MH PRN to R neck/shoulder for elevated pain--not today                           Assessment:   Conditions Requiring Skilled Therapeutic Intervention  Body Structures, Functions, Activity Limitations Requiring Skilled Therapeutic Intervention: Decreased ADL status; Decreased ROM; Decreased tolerance to work activity; Decreased strength;Decreased high-level IADLs;Decreased posture; Increased pain  Assessment: Initiated POC per initial evaluation, pulleys were only tolerated for 2' and abd and ER were the most painful isometric exercises to perform and supine abduction was the most painful wand exercise to perform. She was very tender over her R biceps tendon so TFM performed for 5' to this area and she had a trigger point in her R levator as well. Will monitor her progress and advance her routine as tolerance improves.   Treatment Diagnosis: R shoulder pain  Requires PT Follow-Up: Yes        Goals:  Short Term Goals  Time Frame for Short Term Goals: 3-4 weeks  Short Term Goal 1: Independent with HEP  STG Goal 1 Status[de-identified] New  Short Term Goal 2: Demo full, pain-free AROM of RUE  STG Goal 2 Status[de-identified] New  Short Term Goal 3: Improve RUE strength to 5/5  STG Goal 3 Status[de-identified] New  Long Term Goals  Time Frame for Long Term Goals : 4-6 weeks  Long Term Goal 1: Report ability to style her hair with pain no greater than 2/10  LTG Goal 1 Status[de-identified] New  Long Term Goal 2: Report improved tolerance to sleeping on R side  LTG Goal 2 Status[de-identified] New  Long Term Goal 3: Improve QuickDASH score to 20% impairment or better  Patient Goals   Patient Goals : reduce R arm pain    Plan:    Physcial Therapy Plan  Plan weeks: 4-6 weeks  Current Treatment Recommendations: Strengthening, ROM, Manual, Pain management, Home exercise program, Positioning, Modalities, Equipment evaluation, education, & procurement, Patient/Caregiver education & training, Safety education & training, Therapeutic activities      Therapy Time:   Individual Concurrent Group Co-treatment   Time In [phone]         Time Out [address] Shashi OGLESBY PTA   Electronically signed by Jamshid Schwartz PTA on 12/28/2022 at 2:37 PM

## 2022-12-28 NOTE — TELEPHONE ENCOUNTER
[facility] states they are unable to get the Ozempic 1 mg. Asking for Ozempic 4 mg/ 3ml. 1 ml per dose pen to be sent instead.

## 2023-01-03 ENCOUNTER — HOSPITAL ENCOUNTER (OUTPATIENT)
Dept: PHYSICAL THERAPY | Age: 48
Setting detail: THERAPIES SERIES
Discharge: HOME | End: 2023-01-03
Payer: COMMERCIAL

## 2023-01-03 PROCEDURE — 97140 MANUAL THERAPY 1/> REGIONS: CPT

## 2023-01-03 PROCEDURE — 97110 THERAPEUTIC EXERCISES: CPT

## 2023-01-03 ASSESSMENT — PAIN DESCRIPTION - ORIENTATION: ORIENTATION: RIGHT

## 2023-01-03 ASSESSMENT — PAIN DESCRIPTION - PAIN TYPE: TYPE: CHRONIC PAIN

## 2023-01-03 ASSESSMENT — PAIN DESCRIPTION - LOCATION: LOCATION: SHOULDER;ARM

## 2023-01-03 NOTE — PROGRESS NOTES
Physical Therapy  Daily Treatment Note  Date: 1/3/2023  Patient Name: Germán Hutchins  MRN: [id number]     :   1975    Referring Physician: MD Simone De La Torre MD   PCP: Simone Ac MD    Medical Diagnosis: Impingement syndrome of right shoulder [M75.41] R shoulder pain  Treatment Diagnosis: R shoulder pain      Insurance: Payor: Wilner Hollis / Plan: [address] [place] [address] / Product Type: *No Product type* /   Insurance ID: [id number] - ([facility])    Subjective:   General  Diagnosis: R shoulder pain  Referring Provider (secondary): Simone Ac MD  PT Insurance Information: [employer] (No precert req'd. 90 visits per yr PT/OT/ST, 0 used)  Total # of Visits Approved: 12  Total # of Visits to Date: 3  Plan of Care/Certification Expiration Date: 23  Progress Note Due Date: 23  Referring Provider (secondary): Simone Ac MD  Subjective: i've been doing what you showed me the other day, pain maybe a /10.   Patient Currently in Pain: Yes  Best Pain Level: 1  Pain Type: Chronic pain  Pain Location: Shoulder, Arm  Pain Orientation: Right       Treatment Activities:  Exercises:      Treatment Reasoning    Exercise 1: Pulleys x 2'  Exercise 2: RUE pendulum (circles, fwd/back, side to side) x 5 ea  Exercise 3: Wall walk x 5  Exercise 4: Isometric R shoulder flex/ext/abd/ER/IR  x 10  Exercise 5: Pec stretch on doorway  10\" x 3  Exercise 6: Corner stretch  10\" x 3  Exercise 7: Slouch with overcorrection  x 10  Exercise 8: R upper trap/levator stretch  10\" x 3 ea  Exercise 9: 3 way elbow flexion  2#  10/10/10  Exercise 10: Triceps extension with t-band  Exercise 11: Pronation/supination with offset weight  1#  x 10  Exercise 12: Supine cane flex/ER/abd  x 10  Exercise 13: Supine benchpress/shoulder protraction  x 10  Exercise 14: PROM/AAROM R shoulder flex/abd/ER/IR  x 10  Exercise 15: Standing Is, Ts  x 5  Exercise 16: Scapular retraction with t-band--not today  Exercise 17: Tband shoulder flex/ext/abd/ER/IR--not today  Exercise 18: IASTM/STM to R neck/shoulder   STM x 8'  (5' of that TFM to biceps tendon)  Exercise 19: IFC + MH PRN to R neck/shoulder for elevated pain--not today  Exercise 20: 1/3/23: HEP ISSUED                           Assessment:   Conditions Requiring Skilled Therapeutic Intervention  Body Structures, Functions, Activity Limitations Requiring Skilled Therapeutic Intervention: Decreased ADL status; Decreased ROM; Decreased tolerance to work activity; Decreased strength;Decreased high-level IADLs;Decreased posture; Increased pain  Assessment: HEP issued with pictures and written instructions and reviewed during therapy session, she had better toerance for exercise routine but several exercises still are irritating and informed her to perform them in a pain free ROM and not to begin the resisted t-band exercises until we begin to perform them in clinic.  over R biceps tendon and trigger point in R levator.   Treatment Diagnosis: R shoulder pain  Requires PT Follow-Up: Yes      Goals:  Short Term Goals  Time Frame for Short Term Goals: 3-4 weeks  Short Term Goal 1: Independent with HEP  STG Goal 1 Status[de-identified] New  Short Term Goal 2: Demo full, pain-free AROM of RUE  STG Goal 2 Status[de-identified] New  Short Term Goal 3: Improve RUE strength to 5/5  STG Goal 3 Status[de-identified] New  Long Term Goals  Time Frame for Long Term Goals : 4-6 weeks  Long Term Goal 1: Report ability to style her hair with pain no greater than 2/10  LTG Goal 1 Status[de-identified] New  Long Term Goal 2: Report improved tolerance to sleeping on R side  LTG Goal 2 Status[de-identified] New  Long Term Goal 3: Improve QuickDASH score to 20% impairment or better  Patient Goals   Patient Goals : reduce R arm pain    Plan:    Physcial Therapy Plan  Plan weeks: 4-6 weeks  Current Treatment Recommendations: Strengthening, ROM, Manual, Pain management, Home exercise program, Positioning, Modalities, Equipment evaluation, education, & procurement, Patient/Caregiver education & training, Safety education & training, Therapeutic activities      Therapy Time:   Individual Concurrent Group Co-treatment   Time In [address] [facility]         Time Out [address] [place] [address]   Electronically signed by Doreen Moses PTA on 1/3/2023 at 10:16 AM

## 2023-01-05 ENCOUNTER — APPOINTMENT (OUTPATIENT)
Dept: PHYSICAL THERAPY | Age: 48
End: 2023-01-05
Payer: COMMERCIAL

## 2023-01-10 ENCOUNTER — APPOINTMENT (OUTPATIENT)
Dept: PHYSICAL THERAPY | Age: 48
End: 2023-01-10
Payer: COMMERCIAL

## 2023-01-12 ENCOUNTER — APPOINTMENT (OUTPATIENT)
Dept: PHYSICAL THERAPY | Age: 48
End: 2023-01-12
Payer: COMMERCIAL

## 2023-01-17 ENCOUNTER — HOSPITAL ENCOUNTER (OUTPATIENT)
Dept: PHYSICAL THERAPY | Age: 48
Setting detail: THERAPIES SERIES
Discharge: HOME | End: 2023-01-17
Payer: COMMERCIAL

## 2023-01-17 ASSESSMENT — PAIN DESCRIPTION - PAIN TYPE: TYPE: CHRONIC PAIN

## 2023-01-17 ASSESSMENT — PAIN DESCRIPTION - LOCATION: LOCATION: SHOULDER;ARM

## 2023-01-17 ASSESSMENT — PAIN SCALES - GENERAL: PAINLEVEL_OUTOF10: 4

## 2023-01-17 NOTE — PROGRESS NOTES
Physical Therapy: Daily Note   Patient: Barbara Duarte (56 y.o. female)   Examination Date:   Plan of Care/Certification Expiration Date: 23    No data recorded   :  1975 # of Visits since [facility]:   4   MRN: [id number]  CSN: [id number] Start of Care Date:   2022   Insurance: Payor: Tiffany Medrano / Plan: [address] [place] [address] / Product Type: *No Product type* /   Insurance ID: [id number] - ([facility]) Secondary Insurance (if applicable):    Referring Physician: MD Stefani Mcknight MD   PCP: Stefani Simon MD Visits to Date/Visits Approved:     No Show/Cancelled Appts:   /       Medical Diagnosis: Impingement syndrome of right shoulder [M75.41] R shoulder pain  Treatment Diagnosis: R shoulder pain        SUBJECTIVE EXAMINATION   Pain Level: Pain Screening  Patient Currently in Pain: Yes  Pain Assessment: 0-10  Pain Level: 4  Pain Type: Chronic pain  Pain Location: Shoulder, Arm    Patient Comments: Subjective: I am having a day. I am over this pain. I dont know when I go back to the doctor. I cant do this session. I need to leave and get something done. ASSESSMENT     Assessment: Assessment: Patient presented to therapy crying and upset due to chronic shoulder pain. She is going to follow up with her doctor. No treatment administered today.   Body Structures, Functions, Activity Limitations Requiring Skilled Therapeutic Intervention: Decreased ADL status, Decreased ROM, Decreased tolerance to work activity, Decreased strength, Decreased high-level IADLs, Decreased posture, Increased pain                   Therapy Time  Individual Time In: 7550       Individual Time Out: 6786  Minutes: 5  Timed Code Treatment Minutes: 5 Minutes          Electronically signed by Mary Lambert PTA  on 2023 at 9:15 AM   POC NOTE  Electronically signed by Mary Lambert PTA on 2023 at 9:20 AM

## 2023-01-19 ENCOUNTER — HOSPITAL ENCOUNTER (OUTPATIENT)
Dept: PHYSICAL THERAPY | Age: 48
Setting detail: THERAPIES SERIES
End: 2023-01-19
Payer: COMMERCIAL

## 2023-09-13 NOTE — PATIENT INSTRUCTIONS
Patient Education        Weeks 6 to 10 of Your Pregnancy: Care Instructions  Your Care Instructions    Congratulations on your pregnancy. This is an exciting and important time for you. During the first 6 to 10 weeks of your pregnancy, your body goes through many changes. Your baby grows very fast, even though you cannot feel it yet. You may start to notice that you feel different, both in your body and your emotions. Because each woman's pregnancy is unique, there is no right way to feel. You may feel the healthiest you have ever been, or you may feel tired or sick to your stomach (\"morning sickness\"). These early weeks are a time to make healthy choices and to eat the best foods for you and your baby. This care sheet will give you some ideas. This is also a good time to think about birth defects testing. These are tests done during pregnancy to look for possible problems with the baby. First trimester tests for birth defects can be done between 8 and 17 weeks of pregnancy, depending on the test. Talk with your doctor about what kinds of tests are available. Follow-up care is a key part of your treatment and safety. Be sure to make and go to all appointments, and call your doctor if you are having problems. It's also a good idea to know your test results and keep a list of the medicines you take. How can you care for yourself at home? Eat well  · Eat at least 3 meals and 2 healthy snacks every day. Eat fresh, whole foods, including:  ? 7 or more servings of bread, tortillas, cereal, rice, pasta, or oatmeal.  ? 3 or more servings of vegetables, especially leafy green vegetables. ? 2 or more servings of fruits. ? 3 or more servings of milk, yogurt, or cheese. ? 2 or more servings of meat, turkey, chicken, fish, eggs, or dried beans. · Drink plenty of fluids, especially water. Avoid sodas and other sweetened drinks.   · Choose foods that have important vitamins for your baby, such as calcium, iron, and folate. ? Dairy products, tofu, canned fish with bones, almonds, broccoli, dark leafy greens, corn tortillas, and fortified orange juice are good sources of calcium. ? Beef, poultry, liver, spinach, lentils, dried beans, fortified cereals, and dried fruits are rich in iron. ? Dark leafy greens, broccoli, asparagus, liver, fortified cereals, orange juice, peanuts, and almonds are good sources of folate. · Avoid foods that could harm your baby. ? Do not eat raw or undercooked meat, chicken, or fish (such as sushi or raw oysters). ? Do not eat raw eggs or foods that contain raw eggs, such as Caesar dressing. ? Do not eat soft cheeses and unpasteurized dairy foods, such as Brie, feta, or blue cheese. ? Do not eat fish that contains a lot of mercury, such as shark, swordfish, tilefish, or juanito mackerel. Do not eat more than 6 ounces of tuna each week. ? Do not eat raw sprouts, especially alfalfa sprouts. ? Cut down on caffeine, such as coffee, tea, and cola. Protect yourself and your baby  · Do not touch maliha litter or cat feces. They can cause an infection that could harm your baby. · High body temperature can be harmful to your baby. So if you want to use a sauna or hot tub, be sure to talk to your doctor about how to use it safely. [place] with morning sickness  · Sip small amounts of water, juices, or shakes. Try drinking between meals, not with meals. · Eat 5 or 6 small meals a day. Try dry toast or crackers when you first get up, and eat breakfast a little later. · Avoid spicy, greasy, and fatty foods. · When you feel sick, open your windows or go for a short walk to get fresh air. · Try nausea wristbands. These help some women. · Tell your doctor if you think your prenatal vitamins make you sick. Where can you learn more? Go to https://pretty.[employer]. org and sign in to your [employer] account.  Enter G112 in the [employer] box to learn more about \"Weeks 6 to 10 of Your Pregnancy: Care Instructions. \"     If you do not have an account, please click on the \"Sign Up Now\" link. Current as of: September 5, 2018  Content Version: 12.0  © 0461-0210 Healthwise, Incorporated. Care instructions adapted under license by [facility] ([facility]). If you have questions about a medical condition or this instruction, always ask your healthcare professional. [address] any warranty or liability for your use of this information. Melolabial Interpolation Flap Text: A decision was made to reconstruct the defect utilizing an interpolation axial flap and a staged reconstruction.  A telfa template was made of the defect.  This telfa template was then used to outline the melolabial interpolation flap.  The donor area for the pedicle flap was then injected with anesthesia.  The flap was excised through the skin and subcutaneous tissue down to the layer of the underlying musculature.  The pedicle flap was carefully excised within this deep plane to maintain its blood supply.  The edges of the donor site were undermined.   The donor site was closed in a primary fashion.  The pedicle was then rotated into position and sutured.  Once the tube was sutured into place, adequate blood supply was confirmed with blanching and refill.  The pedicle was then wrapped with xeroform gauze and dressed appropriately with a telfa and gauze bandage to ensure continued blood supply and protect the attached pedicle.

## 2024-08-30 ENCOUNTER — OFFICE VISIT (OUTPATIENT)
Dept: PRIMARY CARE CLINIC | Age: 49
End: 2024-08-30
Payer: COMMERCIAL

## 2024-08-30 VITALS
HEART RATE: 72 BPM | RESPIRATION RATE: 18 BRPM | SYSTOLIC BLOOD PRESSURE: 146 MMHG | HEIGHT: 62 IN | BODY MASS INDEX: 46.3 KG/M2 | OXYGEN SATURATION: 100 % | DIASTOLIC BLOOD PRESSURE: 100 MMHG | TEMPERATURE: 96.1 F | WEIGHT: 251.6 LBS

## 2024-08-30 DIAGNOSIS — E11.9 TYPE 2 DIABETES MELLITUS WITHOUT COMPLICATION, WITH LONG-TERM CURRENT USE OF INSULIN (HCC): ICD-10-CM

## 2024-08-30 DIAGNOSIS — Z12.31 ENCOUNTER FOR SCREENING MAMMOGRAM FOR MALIGNANT NEOPLASM OF BREAST: ICD-10-CM

## 2024-08-30 DIAGNOSIS — E78.2 MIXED HYPERLIPIDEMIA: ICD-10-CM

## 2024-08-30 DIAGNOSIS — Z79.4 TYPE 2 DIABETES MELLITUS WITHOUT COMPLICATION, WITH LONG-TERM CURRENT USE OF INSULIN (HCC): ICD-10-CM

## 2024-08-30 DIAGNOSIS — Z12.11 COLON CANCER SCREENING: ICD-10-CM

## 2024-08-30 DIAGNOSIS — Z79.4 TYPE 2 DIABETES MELLITUS WITHOUT COMPLICATION, WITH LONG-TERM CURRENT USE OF INSULIN (HCC): Primary | ICD-10-CM

## 2024-08-30 DIAGNOSIS — E11.9 TYPE 2 DIABETES MELLITUS WITHOUT COMPLICATION, WITH LONG-TERM CURRENT USE OF INSULIN (HCC): Primary | ICD-10-CM

## 2024-08-30 LAB
ALBUMIN SERPL-MCNC: 3.9 G/DL (ref 3.5–5.2)
ALP SERPL-CCNC: 130 U/L (ref 35–104)
ALT SERPL-CCNC: 17 U/L (ref 5–33)
ANION GAP SERPL CALCULATED.3IONS-SCNC: 11 MMOL/L (ref 7–19)
AST SERPL-CCNC: 10 U/L (ref 5–32)
BASOPHILS # BLD: 0.1 K/UL (ref 0–0.2)
BASOPHILS NFR BLD: 0.5 % (ref 0–1)
BILIRUB SERPL-MCNC: 0.3 MG/DL (ref 0.2–1.2)
BUN SERPL-MCNC: 19 MG/DL (ref 6–20)
CALCIUM SERPL-MCNC: 9.4 MG/DL (ref 8.6–10)
CHLORIDE SERPL-SCNC: 100 MMOL/L (ref 98–111)
CHOLEST SERPL-MCNC: 236 MG/DL (ref 0–199)
CO2 SERPL-SCNC: 25 MMOL/L (ref 22–29)
CREAT SERPL-MCNC: 0.8 MG/DL (ref 0.5–0.9)
EOSINOPHIL # BLD: 0.1 K/UL (ref 0–0.6)
EOSINOPHIL NFR BLD: 0.7 % (ref 0–5)
ERYTHROCYTE [DISTWIDTH] IN BLOOD BY AUTOMATED COUNT: 12.5 % (ref 11.5–14.5)
GLUCOSE SERPL-MCNC: 300 MG/DL (ref 70–99)
HBA1C MFR BLD: 11.2 % (ref 4–5.6)
HCT VFR BLD AUTO: 46.3 % (ref 37–47)
HDLC SERPL-MCNC: 41 MG/DL (ref 40–60)
HGB BLD-MCNC: 15.7 G/DL (ref 12–16)
IMM GRANULOCYTES # BLD: 0.1 K/UL
LDLC SERPL CALC-MCNC: 151 MG/DL
LYMPHOCYTES # BLD: 3 K/UL (ref 1.1–4.5)
LYMPHOCYTES NFR BLD: 31.5 % (ref 20–40)
MCH RBC QN AUTO: 30.5 PG (ref 27–31)
MCHC RBC AUTO-ENTMCNC: 33.9 G/DL (ref 33–37)
MCV RBC AUTO: 89.9 FL (ref 81–99)
MONOCYTES # BLD: 0.4 K/UL (ref 0–0.9)
MONOCYTES NFR BLD: 4.3 % (ref 0–10)
NEUTROPHILS # BLD: 6 K/UL (ref 1.5–7.5)
NEUTS SEG NFR BLD: 62.5 % (ref 50–65)
PLATELET # BLD AUTO: 258 K/UL (ref 130–400)
PMV BLD AUTO: 10.6 FL (ref 9.4–12.3)
POTASSIUM SERPL-SCNC: 4.4 MMOL/L (ref 3.5–5)
PROT SERPL-MCNC: 6.5 G/DL (ref 6.4–8.3)
RBC # BLD AUTO: 5.15 M/UL (ref 4.2–5.4)
SODIUM SERPL-SCNC: 136 MMOL/L (ref 136–145)
TRIGL SERPL-MCNC: 218 MG/DL (ref 0–149)
WBC # BLD AUTO: 9.6 K/UL (ref 4.8–10.8)

## 2024-08-30 PROCEDURE — 99214 OFFICE O/P EST MOD 30 MIN: CPT | Performed by: FAMILY MEDICINE

## 2024-08-30 RX ORDER — SEMAGLUTIDE 1.34 MG/ML
0.25 INJECTION, SOLUTION SUBCUTANEOUS WEEKLY
Qty: 3 ML | Refills: 1 | Status: SHIPPED | OUTPATIENT
Start: 2024-08-30 | End: 2024-09-05 | Stop reason: SDUPTHER

## 2024-08-30 RX ORDER — SIMVASTATIN 40 MG
40 TABLET ORAL NIGHTLY
Qty: 90 TABLET | Refills: 1 | Status: SHIPPED | OUTPATIENT
Start: 2024-08-30

## 2024-08-30 RX ORDER — SEMAGLUTIDE 1.34 MG/ML
0.25 INJECTION, SOLUTION SUBCUTANEOUS WEEKLY
Qty: 3 ML | Refills: 1 | Status: SHIPPED | OUTPATIENT
Start: 2024-08-30 | End: 2024-08-30 | Stop reason: SDUPTHER

## 2024-08-30 ASSESSMENT — ENCOUNTER SYMPTOMS
CHEST TIGHTNESS: 0
ABDOMINAL PAIN: 0
WHEEZING: 0
BLOOD IN STOOL: 0
SHORTNESS OF BREATH: 0

## 2024-08-30 NOTE — TELEPHONE ENCOUNTER
Jeanie needs her Metformin to go to [facility] if you can change that please.

## 2024-08-30 NOTE — PROGRESS NOTES
Jeanie Skelton (:  1975) is a 49 y.o. female,Established patient, here for evaluation of the following chief complaint(s):  Diabetes (Pt here for diabetes f/u. ), Back Pain (Pt c/o upper left back pain. ), Leg Pain (Pt c/o leg cramping. ), and Chest Pain (Pt c/o chest pain on both right and left side. She does not think it is heart related. )      Assessment & Plan   ASSESSMENT/PLAN:  1. Type 2 diabetes mellitus without complication, with long-term current use of insulin (HCC)  -     Hemoglobin A1C; Future  -     Comprehensive Metabolic Panel; Future  -     CBC with Auto Differential; Future  -     Lipid Panel; Future  -     Semaglutide,0.25 or 0.5MG/DOS, (OZEMPIC, 0.25 OR 0.5 MG/DOSE,) 2 MG/1.5ML SOPN; Inject 0.25 mg into the skin once a week, Disp-3 mL, R-1Normal  -     metFORMIN (GLUCOPHAGE) 500 MG tablet; Take 1 tablet by mouth daily (with breakfast), Disp-180 tablet, R-1Normal  2. Encounter for screening mammogram for malignant neoplasm of breast  -     HEATH DIGITAL SCREEN W OR WO CAD BILATERAL; Future  3. Colon cancer screening  -     Fecal DNA Colorectal cancer screening (Cologuard)  4. Mixed hyperlipidemia  -     simvastatin (ZOCOR) 40 MG tablet; Take 1 tablet by mouth nightly, Disp-90 tablet, R-1Normal      Patient is historically a poorly controlled diabetic.  Last A1c 1-1/2 years ago is approximately 8.0 however it has been as high as in the 11's previously.  A1c is likely elevated substantially and I did have an extensive discussion with patient on importance of taking medications regularly.  Will restart metformin and Ozempic at this time.  I discussed with patient we will have to start patient at the base dose of Ozempic and uptitrate this once monthly until back at the maintenance dose.  Patient is not on a statin and we will also start that at this time.  Patient is due for mammogram and colonoscopy and mammogram and Cologuard has been ordered.  Will obtain labs at today's visit for further

## 2024-09-03 NOTE — RESULT ENCOUNTER NOTE
A1c is significantly elevated at 11.2 as compared to 8 on last recheck.  Strongly advised patient adhere to medication regimen and follow-up as scheduled.  Electrolytes, kidney function, liver function are normal.  Blood counts are normal.  Cholesterol is also elevated including her bad cholesterol.  Would recommend resuming simvastatin as discussed

## 2024-09-05 DIAGNOSIS — E11.9 TYPE 2 DIABETES MELLITUS WITHOUT COMPLICATION, WITH LONG-TERM CURRENT USE OF INSULIN (HCC): ICD-10-CM

## 2024-09-05 DIAGNOSIS — Z79.4 TYPE 2 DIABETES MELLITUS WITHOUT COMPLICATION, WITH LONG-TERM CURRENT USE OF INSULIN (HCC): ICD-10-CM

## 2024-09-05 RX ORDER — SEMAGLUTIDE 1.34 MG/ML
0.25 INJECTION, SOLUTION SUBCUTANEOUS WEEKLY
Qty: 3 ML | Refills: 1 | Status: SHIPPED | OUTPATIENT
Start: 2024-09-05 | End: 2024-09-06

## 2024-09-06 DIAGNOSIS — E11.9 TYPE 2 DIABETES MELLITUS WITHOUT COMPLICATION, WITH LONG-TERM CURRENT USE OF INSULIN (HCC): ICD-10-CM

## 2024-09-06 DIAGNOSIS — Z79.4 TYPE 2 DIABETES MELLITUS WITHOUT COMPLICATION, WITH LONG-TERM CURRENT USE OF INSULIN (HCC): ICD-10-CM

## 2024-09-06 RX ORDER — SEMAGLUTIDE 0.68 MG/ML
0.25 INJECTION, SOLUTION SUBCUTANEOUS WEEKLY
Qty: 3 ML | Refills: 1 | Status: SHIPPED | OUTPATIENT
Start: 2024-09-06

## 2024-09-11 ENCOUNTER — HOSPITAL ENCOUNTER (OUTPATIENT)
Dept: WOMENS IMAGING | Age: 49
Discharge: HOME | End: 2024-09-11
Payer: COMMERCIAL

## 2024-09-11 DIAGNOSIS — Z12.31 ENCOUNTER FOR SCREENING MAMMOGRAM FOR MALIGNANT NEOPLASM OF BREAST: ICD-10-CM

## 2024-09-11 PROCEDURE — 77063 BREAST TOMOSYNTHESIS BI: CPT

## 2024-09-22 LAB — NONINV COLON CA DNA+OCC BLD SCRN STL QL: NEGATIVE

## 2024-10-28 ENCOUNTER — OFFICE VISIT (OUTPATIENT)
Dept: PRIMARY CARE CLINIC | Age: 49
End: 2024-10-28
Payer: COMMERCIAL

## 2024-10-28 VITALS
OXYGEN SATURATION: 98 % | TEMPERATURE: 96.9 F | DIASTOLIC BLOOD PRESSURE: 84 MMHG | SYSTOLIC BLOOD PRESSURE: 134 MMHG | BODY MASS INDEX: 48.18 KG/M2 | HEART RATE: 78 BPM | HEIGHT: 61 IN | WEIGHT: 255.2 LBS

## 2024-10-28 DIAGNOSIS — Z79.4 TYPE 2 DIABETES MELLITUS WITHOUT COMPLICATION, WITH LONG-TERM CURRENT USE OF INSULIN (HCC): Primary | ICD-10-CM

## 2024-10-28 DIAGNOSIS — E11.9 TYPE 2 DIABETES MELLITUS WITHOUT COMPLICATION, WITH LONG-TERM CURRENT USE OF INSULIN (HCC): Primary | ICD-10-CM

## 2024-10-28 PROCEDURE — 99214 OFFICE O/P EST MOD 30 MIN: CPT | Performed by: FAMILY MEDICINE

## 2024-10-28 PROCEDURE — 3046F HEMOGLOBIN A1C LEVEL >9.0%: CPT | Performed by: FAMILY MEDICINE

## 2024-10-28 RX ORDER — METFORMIN HYDROCHLORIDE 500 MG/1
1000 TABLET, EXTENDED RELEASE ORAL
Qty: 90 TABLET | Refills: 1 | Status: SHIPPED | OUTPATIENT
Start: 2024-10-28

## 2024-10-28 RX ORDER — SEMAGLUTIDE 0.68 MG/ML
0.5 INJECTION, SOLUTION SUBCUTANEOUS WEEKLY
Qty: 3 ML | Refills: 1 | Status: SHIPPED | OUTPATIENT
Start: 2024-10-28

## 2024-10-28 SDOH — ECONOMIC STABILITY: INCOME INSECURITY: HOW HARD IS IT FOR YOU TO PAY FOR THE VERY BASICS LIKE FOOD, HOUSING, MEDICAL CARE, AND HEATING?: SOMEWHAT HARD

## 2024-10-28 SDOH — ECONOMIC STABILITY: FOOD INSECURITY: WITHIN THE PAST 12 MONTHS, YOU WORRIED THAT YOUR FOOD WOULD RUN OUT BEFORE YOU GOT MONEY TO BUY MORE.: SOMETIMES TRUE

## 2024-10-28 SDOH — ECONOMIC STABILITY: FOOD INSECURITY: WITHIN THE PAST 12 MONTHS, THE FOOD YOU BOUGHT JUST DIDN'T LAST AND YOU DIDN'T HAVE MONEY TO GET MORE.: SOMETIMES TRUE

## 2024-10-28 ASSESSMENT — PATIENT HEALTH QUESTIONNAIRE - PHQ9
SUM OF ALL RESPONSES TO PHQ QUESTIONS 1-9: 0
SUM OF ALL RESPONSES TO PHQ QUESTIONS 1-9: 0
SUM OF ALL RESPONSES TO PHQ9 QUESTIONS 1 & 2: 0
SUM OF ALL RESPONSES TO PHQ QUESTIONS 1-9: 0
SUM OF ALL RESPONSES TO PHQ QUESTIONS 1-9: 0
1. LITTLE INTEREST OR PLEASURE IN DOING THINGS: NOT AT ALL
2. FEELING DOWN, DEPRESSED OR HOPELESS: NOT AT ALL

## 2024-10-28 ASSESSMENT — ENCOUNTER SYMPTOMS
WHEEZING: 0
BLOOD IN STOOL: 0
CHEST TIGHTNESS: 0
SHORTNESS OF BREATH: 0
ABDOMINAL PAIN: 0

## 2024-10-28 NOTE — PROGRESS NOTES
electronic transcription/translation of spoken language toprinted texts.  The electronic translation of spoken language may be erroneous, or at times, nonsensical words or phrases may be inadvertently transcribed.  Although I have reviewed the note for such errors, some may stillexist.      An electronic signature was used to authenticate this note.    --Tushar Perez MD

## 2024-11-25 ENCOUNTER — OFFICE VISIT (OUTPATIENT)
Dept: PRIMARY CARE CLINIC | Age: 49
End: 2024-11-25
Payer: COMMERCIAL

## 2024-11-25 VITALS
TEMPERATURE: 97.2 F | BODY MASS INDEX: 46.93 KG/M2 | DIASTOLIC BLOOD PRESSURE: 78 MMHG | OXYGEN SATURATION: 97 % | SYSTOLIC BLOOD PRESSURE: 124 MMHG | HEIGHT: 62 IN | RESPIRATION RATE: 18 BRPM | HEART RATE: 68 BPM | WEIGHT: 255 LBS

## 2024-11-25 DIAGNOSIS — E11.9 TYPE 2 DIABETES MELLITUS WITHOUT COMPLICATION, WITH LONG-TERM CURRENT USE OF INSULIN (HCC): ICD-10-CM

## 2024-11-25 DIAGNOSIS — Z79.4 TYPE 2 DIABETES MELLITUS WITHOUT COMPLICATION, WITH LONG-TERM CURRENT USE OF INSULIN (HCC): ICD-10-CM

## 2024-11-25 DIAGNOSIS — Z79.4 TYPE 2 DIABETES MELLITUS WITHOUT COMPLICATION, WITH LONG-TERM CURRENT USE OF INSULIN (HCC): Primary | ICD-10-CM

## 2024-11-25 DIAGNOSIS — E11.9 TYPE 2 DIABETES MELLITUS WITHOUT COMPLICATION, WITH LONG-TERM CURRENT USE OF INSULIN (HCC): Primary | ICD-10-CM

## 2024-11-25 LAB
CREAT UR-MCNC: 231.3 MG/DL (ref 28–217)
HBA1C MFR BLD: 9.3 % (ref 4–5.6)
MICROALBUMIN UR-MCNC: <1.2 MG/DL (ref 0–1.99)
MICROALBUMIN/CREAT UR-RTO: ABNORMAL MG/G

## 2024-11-25 PROCEDURE — 3046F HEMOGLOBIN A1C LEVEL >9.0%: CPT | Performed by: FAMILY MEDICINE

## 2024-11-25 PROCEDURE — 99213 OFFICE O/P EST LOW 20 MIN: CPT | Performed by: FAMILY MEDICINE

## 2024-11-25 RX ORDER — SEMAGLUTIDE 1.34 MG/ML
1 INJECTION, SOLUTION SUBCUTANEOUS
Qty: 3 ADJUSTABLE DOSE PRE-FILLED PEN SYRINGE | Refills: 0 | Status: SHIPPED | OUTPATIENT
Start: 2024-11-25

## 2024-11-25 SDOH — ECONOMIC STABILITY: FOOD INSECURITY: WITHIN THE PAST 12 MONTHS, YOU WORRIED THAT YOUR FOOD WOULD RUN OUT BEFORE YOU GOT MONEY TO BUY MORE.: NEVER TRUE

## 2024-11-25 SDOH — ECONOMIC STABILITY: INCOME INSECURITY: HOW HARD IS IT FOR YOU TO PAY FOR THE VERY BASICS LIKE FOOD, HOUSING, MEDICAL CARE, AND HEATING?: NOT HARD AT ALL

## 2024-11-25 SDOH — ECONOMIC STABILITY: FOOD INSECURITY: WITHIN THE PAST 12 MONTHS, THE FOOD YOU BOUGHT JUST DIDN'T LAST AND YOU DIDN'T HAVE MONEY TO GET MORE.: NEVER TRUE

## 2024-11-25 ASSESSMENT — PATIENT HEALTH QUESTIONNAIRE - PHQ9
SUM OF ALL RESPONSES TO PHQ QUESTIONS 1-9: 0
2. FEELING DOWN, DEPRESSED OR HOPELESS: NOT AT ALL
SUM OF ALL RESPONSES TO PHQ9 QUESTIONS 1 & 2: 0
SUM OF ALL RESPONSES TO PHQ QUESTIONS 1-9: 0
SUM OF ALL RESPONSES TO PHQ QUESTIONS 1-9: 0
1. LITTLE INTEREST OR PLEASURE IN DOING THINGS: NOT AT ALL
SUM OF ALL RESPONSES TO PHQ QUESTIONS 1-9: 0

## 2024-11-25 ASSESSMENT — ENCOUNTER SYMPTOMS
BLOOD IN STOOL: 0
WHEEZING: 0
ABDOMINAL PAIN: 0
CHEST TIGHTNESS: 0
SHORTNESS OF BREATH: 0

## 2024-11-25 NOTE — PROGRESS NOTES
Jeanie Skelton (:  1975) is a 49 y.o. female,Established patient, here for evaluation of the following chief complaint(s):  Diabetes (4-week-f/u.  Doing good on Ozempic and Metformin.  Discuss Ozempic dosage.  )      Assessment & Plan   ASSESSMENT/PLAN:  1. Type 2 diabetes mellitus without complication, with long-term current use of insulin (HCC)  -     Semaglutide, 1 MG/DOSE, (OZEMPIC, 1 MG/DOSE,) 4 MG/3ML SOPN sc injection; Inject 1 mg into the skin every 7 days, Disp-3 Adjustable Dose Pre-filled Pen Syringe, R-0Normal      Prior A1c was very high and I do think she is going to require the high dose of Ozempic.  Will increase her now to the 1 mg dose with plans to further increase after 1 month.  Patient should continue avoiding high carbohydrate foods  Patient declines flu shot    Return in about 4 weeks (around 2024).         Subjective   SUBJECTIVE/OBJECTIVE:  Jeanie Skelton is a 49 y.o. female who presents for diabetes follow-up.  Blood sugars have been doing okay though patient is not checking this regularly at home.  Patient did not get her A1c done prior to today's visit.  She has been trying to watch sugary foods.  Patient is ready to increase to the 1 mg Ozempic dose as she has now been on the 0.5 for 1 month  No other new concerns            Review of Systems   Constitutional:  Negative for activity change and fever.   HENT:  Negative for congestion.    Eyes:  Negative for visual disturbance.   Respiratory:  Negative for chest tightness, shortness of breath and wheezing.    Cardiovascular:  Negative for chest pain.   Gastrointestinal:  Negative for abdominal pain and blood in stool.   Genitourinary:  Negative for difficulty urinating and urgency.   Neurological:  Negative for weakness and headaches.   Psychiatric/Behavioral:  Negative for confusion.           Objective   Physical Exam  Vitals reviewed.   Constitutional:       General: She is not in acute distress.     Appearance: Normal

## 2024-12-23 ENCOUNTER — OFFICE VISIT (OUTPATIENT)
Dept: PRIMARY CARE CLINIC | Age: 49
End: 2024-12-23
Payer: COMMERCIAL

## 2024-12-23 VITALS
SYSTOLIC BLOOD PRESSURE: 132 MMHG | HEIGHT: 62 IN | HEART RATE: 71 BPM | TEMPERATURE: 97.5 F | RESPIRATION RATE: 16 BRPM | DIASTOLIC BLOOD PRESSURE: 84 MMHG | WEIGHT: 250 LBS | OXYGEN SATURATION: 98 % | BODY MASS INDEX: 46.01 KG/M2

## 2024-12-23 DIAGNOSIS — E11.9 TYPE 2 DIABETES MELLITUS WITHOUT COMPLICATION, WITH LONG-TERM CURRENT USE OF INSULIN (HCC): Primary | ICD-10-CM

## 2024-12-23 DIAGNOSIS — Z79.4 TYPE 2 DIABETES MELLITUS WITHOUT COMPLICATION, WITH LONG-TERM CURRENT USE OF INSULIN (HCC): Primary | ICD-10-CM

## 2024-12-23 PROCEDURE — 99213 OFFICE O/P EST LOW 20 MIN: CPT | Performed by: FAMILY MEDICINE

## 2024-12-23 PROCEDURE — 3046F HEMOGLOBIN A1C LEVEL >9.0%: CPT | Performed by: FAMILY MEDICINE

## 2024-12-23 ASSESSMENT — ENCOUNTER SYMPTOMS
WHEEZING: 0
SHORTNESS OF BREATH: 0
ABDOMINAL PAIN: 0
CHEST TIGHTNESS: 0
BLOOD IN STOOL: 0

## 2024-12-23 NOTE — PROGRESS NOTES
Jeanie Skelton (:  1975) is a 49 y.o. female,Established patient, here for evaluation of the following chief complaint(s):  Follow-up (For diabetes)      Assessment & Plan   ASSESSMENT/PLAN:  1. Type 2 diabetes mellitus without complication, with long-term current use of insulin (HCC)  -     semaglutide, 2 MG/DOSE, (OZEMPIC) 8 MG/3ML SOPN sc injection; Inject 2 mg into the skin every 7 days, Disp-9 mL, R-2Normal  -     HM DIABETES FOOT EXAM      Overall patient seems to be doing much better and her lifestyle choices.  I am going to send through an increased dose of the Ozempic given that after 2 weeks she will go to this and be on it going forward.  Patient will be due for repeat A1c in 2 months and I will have her follow-up around that time.  Would advise continued carbohydrate avoidance and increasing exercise.  I do think the weight loss will also help to benefit her diabetes control.  Ideally I would prefer she lose an additional 60 or so pounds though I do realize this would be difficult to achieve    Return in about 2 months (around 2025).         Subjective   SUBJECTIVE/OBJECTIVE:  Jeanie Skelton is a 49 y.o. female who presents for diabetes follow-up.  Patient has successfully lost another 5 pounds since being on the higher dose of Ozempic.  She has noticed a lessening of her overall appetite.  She has now had 2 shots of the 1 mg dose.  She has been taking all of her medications regularly.  She is trying to exercise more and walks on a regular basis.  No new concerns              Review of Systems   Constitutional:  Negative for activity change, fatigue and fever.   HENT:  Negative for congestion.    Eyes:  Negative for visual disturbance.   Respiratory:  Negative for chest tightness, shortness of breath and wheezing.    Cardiovascular:  Negative for chest pain.   Gastrointestinal:  Negative for abdominal pain and blood in stool.   Genitourinary:  Negative for difficulty urinating and urgency.

## (undated) DEVICE — Z INACTIVE USE 2660664 SOLUTION IRRIG 3000ML 0.9% SOD CHL USP UROMATIC PLAS CONT

## (undated) DEVICE — SOUND/DILATOR (ACCESSORY FOR CLEARVIEW UTERINE MANIPULATOR): Brand: CLEARVIEW

## (undated) DEVICE — Z DISCONTINUED NO SUB IDED KIT ENDOMET ABLAT IMPED CTRL DEV W/ RF CTRL FTSWCH SUCT LN

## (undated) DEVICE — Y-TYPE TUR/BLADDER IRRIGATION SET, REGULATING CLAMP

## (undated) DEVICE — SURGICAL PROCEDURE PACK GYNECOLOGIC MIN LOURDES HOSP

## (undated) DEVICE — KIT CANSTR VAC TANTEM TB FOR AQUILEX FLD CTRL SYS

## (undated) DEVICE — TOWEL,OR,DSP,ST,BLUE,DLX,4/PK,20PK/CS: Brand: MEDLINE

## (undated) DEVICE — GLOVE SURG SZ 65 CRM LTX FREE POLYISOPRENE POLYMER BEAD ANTI

## (undated) DEVICE — SET FLD MGMT CTRL SYS INFLO TB AQUILEX

## (undated) DEVICE — SET ENDOSCP SEAL HYSTEROSCOPE RIG OUTFLO CHN DISP MYOSURE

## (undated) DEVICE — SET FLD MGMT OUTFLO TB DISP FOR CTRL SYS AQUILEX

## (undated) DEVICE — SEAL ENDO INSTR SELF SEAL UROLOGY

## (undated) DEVICE — DEVICE TISS REM IU CANSTR VAC TB FT PEDAL DISPOSABLE MYOSURE

## (undated) DEVICE — GOWN,PREVENTION PLUS,XLN/XL,ST,24/CS: Brand: MEDLINE

## (undated) DEVICE — SOLUTION IV IRRIG POUR BRL 0.9% SODIUM CHL 2F7124

## (undated) DEVICE — LEGGINGS, PAIR, CLEAR, STERILE: Brand: MEDLINE